# Patient Record
Sex: FEMALE | Race: WHITE | HISPANIC OR LATINO | Employment: UNEMPLOYED | URBAN - METROPOLITAN AREA
[De-identification: names, ages, dates, MRNs, and addresses within clinical notes are randomized per-mention and may not be internally consistent; named-entity substitution may affect disease eponyms.]

---

## 2017-06-04 ENCOUNTER — HOSPITAL ENCOUNTER (EMERGENCY)
Facility: HOSPITAL | Age: 42
Discharge: HOME | End: 2017-06-04
Attending: EMERGENCY MEDICINE

## 2017-06-04 VITALS
TEMPERATURE: 99 F | BODY MASS INDEX: 29.32 KG/M2 | RESPIRATION RATE: 16 BRPM | HEART RATE: 84 BPM | SYSTOLIC BLOOD PRESSURE: 139 MMHG | DIASTOLIC BLOOD PRESSURE: 68 MMHG | OXYGEN SATURATION: 99 % | WEIGHT: 176 LBS | HEIGHT: 65 IN

## 2017-06-04 DIAGNOSIS — R10.2 PELVIC PAIN IN FEMALE: Primary | ICD-10-CM

## 2017-06-04 DIAGNOSIS — M54.50 BILATERAL LOW BACK PAIN WITHOUT SCIATICA, UNSPECIFIED CHRONICITY: ICD-10-CM

## 2017-06-04 LAB
B-HCG UR QL: NEGATIVE
BACTERIA #/AREA URNS HPF: NORMAL /HPF
BILIRUB UR QL STRIP: NEGATIVE
CLARITY UR: CLEAR
COLOR UR: YELLOW
CTP QC/QA: YES
GLUCOSE SERPL-MCNC: NORMAL MG/DL (ref 70–?)
GLUCOSE UR QL STRIP: NEGATIVE
HGB UR QL STRIP: ABNORMAL
KETONES UR QL STRIP: NEGATIVE
LEUKOCYTE ESTERASE UR QL STRIP: NEGATIVE
MICROSCOPIC COMMENT: NORMAL
NITRITE UR QL STRIP: NEGATIVE
PH UR STRIP: 6 [PH] (ref 5–8)
PROT UR QL STRIP: NEGATIVE
RBC #/AREA URNS HPF: 3 /HPF (ref 0–4)
SP GR UR STRIP: 1.01 (ref 1–1.03)
URN SPEC COLLECT METH UR: ABNORMAL
UROBILINOGEN UR STRIP-ACNC: NEGATIVE EU/DL
WBC #/AREA URNS HPF: 0 /HPF (ref 0–5)

## 2017-06-04 PROCEDURE — 81000 URINALYSIS NONAUTO W/SCOPE: CPT

## 2017-06-04 PROCEDURE — 82962 GLUCOSE BLOOD TEST: CPT

## 2017-06-04 PROCEDURE — 87591 N.GONORRHOEAE DNA AMP PROB: CPT

## 2017-06-04 PROCEDURE — 63600175 PHARM REV CODE 636 W HCPCS: Performed by: PHYSICIAN ASSISTANT

## 2017-06-04 PROCEDURE — 96372 THER/PROPH/DIAG INJ SC/IM: CPT

## 2017-06-04 PROCEDURE — 99284 EMERGENCY DEPT VISIT MOD MDM: CPT | Mod: 25

## 2017-06-04 RX ORDER — IBUPROFEN 600 MG/1
600 TABLET ORAL EVERY 6 HOURS PRN
Qty: 20 TABLET | Refills: 0 | Status: SHIPPED | OUTPATIENT
Start: 2017-06-04 | End: 2017-06-11

## 2017-06-04 RX ORDER — KETOROLAC TROMETHAMINE 30 MG/ML
30 INJECTION, SOLUTION INTRAMUSCULAR; INTRAVENOUS
Status: COMPLETED | OUTPATIENT
Start: 2017-06-04 | End: 2017-06-04

## 2017-06-04 RX ADMIN — KETOROLAC TROMETHAMINE 30 MG: 30 INJECTION, SOLUTION INTRAMUSCULAR at 12:06

## 2017-06-04 NOTE — ED NOTES
Pt c/o right lower back pain and pelvic pain x1 week. Also states she missed her last period, and LMP was 4/28. Denies vaginal discharge or bleeding.

## 2017-06-04 NOTE — ED PROVIDER NOTES
Encounter Date: 6/4/2017       History     Chief Complaint   Patient presents with    Back Pain     lower back pain with pelvic pain x 1 week.  Denies vaginal discharge or fever.  Denies nausea/vomiting. Denies trauma.  Reports breasts feel swollen     Review of patient's allergies indicates:  No Known Allergies  41-year-old nontoxic/afebrile female presents to the ED with lower abdominal cramping for the past few days.  She reports that her cycle is approximately one week late and she is concerned about pregnancy.  She does report some bilateral breast tenderness and lower abdominal cramping however denies any vaginal bleeding.  She states the pain does radiate minimally to the right lower back.  She denies any other associated symptoms including fever, chills, nausea, vomiting, dysuria, hematuria, vaginal discharge, or vaginal bleeding.  She did not try any medications for the symptoms.      The history is provided by the patient.     History reviewed. No pertinent past medical history.  History reviewed. No pertinent surgical history.  History reviewed. No pertinent family history.  Social History   Substance Use Topics    Smoking status: Never Smoker    Smokeless tobacco: Not on file    Alcohol use No     Review of Systems   Constitutional: Negative for activity change, appetite change and fever.   HENT: Negative for congestion and sore throat.    Respiratory: Negative for shortness of breath.    Cardiovascular: Negative for chest pain.   Gastrointestinal: Positive for abdominal pain. Negative for constipation, diarrhea, nausea and vomiting.   Endocrine: Negative for polydipsia and polyuria.   Genitourinary: Positive for dysuria and pelvic pain. Negative for decreased urine volume, flank pain, hematuria, vaginal bleeding and vaginal discharge.   Musculoskeletal: Positive for back pain. Negative for arthralgias, myalgias and neck pain.   Skin: Negative for rash.   Neurological: Negative for weakness and  numbness.   Hematological: Does not bruise/bleed easily.       Physical Exam     Initial Vitals [06/04/17 1113]   BP Pulse Resp Temp SpO2   139/68 84 16 98.6 °F (37 °C) 99 %     Physical Exam    Nursing note and vitals reviewed.  Constitutional: Vital signs are normal. She appears well-developed and well-nourished. She is cooperative.  Non-toxic appearance. She does not appear ill. No distress.   HENT:   Head: Normocephalic and atraumatic.   Eyes: Conjunctivae and lids are normal.   Neck: Neck supple. No no neck rigidity.   Cardiovascular: Normal rate and regular rhythm.   Pulmonary/Chest: Breath sounds normal. No respiratory distress. She has no wheezes. She has no rhonchi.   Abdominal: Soft. Normal appearance and bowel sounds are normal. There is no tenderness. There is no rigidity and no guarding.   Musculoskeletal: Normal range of motion.   Neurological: She is alert and oriented to person, place, and time. GCS eye subscore is 4. GCS verbal subscore is 5. GCS motor subscore is 6.   Skin: Skin is warm, dry and intact. No rash noted.   Psychiatric: She has a normal mood and affect. Her speech is normal and behavior is normal. Thought content normal.         ED Course   Procedures  Labs Reviewed   URINALYSIS - Abnormal; Notable for the following:        Result Value    Occult Blood UA 1+ (*)     All other components within normal limits   C. TRACHOMATIS/N. GONORRHOEAE BY AMP DNA   URINALYSIS MICROSCOPIC             Medical Decision Making:   Initial Assessment:   41-year-old nontoxic/afebrile female presents to the ED with lower abdominal cramping for the past few days.  She reports that her cycle is approximately one week late and she is concerned about pregnancy.  She does report some bilateral breast tenderness and lower abdominal cramping however denies any vaginal bleeding.  She states the pain does radiate minimally to the right lower back.  She denies any other associated symptoms including fever, chills,  nausea, vomiting, dysuria, hematuria, vaginal discharge, or vaginal bleeding.  She did not try any medications for the symptoms. physical exam reveals patient well-appearing in no obvious distress.  Mucous membranes are moist and free of pallor.  Lungs clear to auscultation, heart regular rate and rhythm.  Abdomen is soft and nontender with no rebound, rigidity, or CVA tenderness; bowel sounds normal.  Full range of motion of neck and all extremities with strength 5 out of 5 bilaterally skin free of rash, pallor, diaphoresis.  Differential Diagnosis:   UTI, dysmenorrhea, cervicitis, pregnancy, amenorrhea, PID, muscle strain  Clinical Tests:   Lab Tests: Ordered and Reviewed  ED Management:  UA and UPT are unremarkable.  Symptoms reduced with Toradol in the ED.  We will send a GC with possible treatment at that time as low suspicion for infectious etiology at this point.  This patient that denies any vaginal discharge or vaginal pain.  No labs or imaging warranted at this time as patient remained afebrile and well-appearing with no tenderness on exam.  Instructed patient to continue NSAIDs as etiology is likely due to cycle about to start. Patient was cautioned on when to return to ED. she was instructed to follow-up with [facility] pain continue.  Patient verbalized understanding and agreement with the treatment plan.                Attending Attestation:     Physician Attestation Statement for NP/PA:   I discussed this assessment and plan of this patient with the NP/PA, but I did not personally examine the patient. The face to face encounter was performed by the NP/PA.    Other NP/PA Attestation Additions:    History of Present Illness: 41-year-old female with lower abdominal cramping and lower back pain.  Also breast tenderness.  She is concerned she is pregnant.    Medical Decision Making: UPT is negative.  Symptoms seem to be related to her upcoming menstrual cycle.  We will treat symptomatically with NSAIDs.                  ED Course     Clinical Impression:   The primary encounter diagnosis was Pelvic pain in female. A diagnosis of Bilateral low back pain without sciatica, unspecified chronicity was also pertinent to this visit.          SUDARSHAN Esquivel  06/04/17 1541       Sakshi Holley MD  06/04/17 2255

## 2017-06-05 LAB
C TRACH DNA SPEC QL NAA+PROBE: NOT DETECTED
N GONORRHOEA DNA SPEC QL NAA+PROBE: NOT DETECTED

## 2017-06-11 ENCOUNTER — HOSPITAL ENCOUNTER (EMERGENCY)
Facility: HOSPITAL | Age: 42
Discharge: HOME | End: 2017-06-11
Attending: EMERGENCY MEDICINE

## 2017-06-11 VITALS
DIASTOLIC BLOOD PRESSURE: 68 MMHG | SYSTOLIC BLOOD PRESSURE: 117 MMHG | HEART RATE: 70 BPM | BODY MASS INDEX: 29.16 KG/M2 | TEMPERATURE: 98 F | WEIGHT: 175 LBS | RESPIRATION RATE: 14 BRPM | HEIGHT: 65 IN | OXYGEN SATURATION: 100 %

## 2017-06-11 DIAGNOSIS — R10.9 RIGHT FLANK PAIN: ICD-10-CM

## 2017-06-11 DIAGNOSIS — R10.9 ABDOMINAL PAIN, UNSPECIFIED LOCATION: Primary | ICD-10-CM

## 2017-06-11 LAB
ALBUMIN SERPL BCP-MCNC: 3.9 G/DL
ALP SERPL-CCNC: 72 U/L
ALT SERPL W/O P-5'-P-CCNC: 22 U/L
ANION GAP SERPL CALC-SCNC: 9 MMOL/L
AST SERPL-CCNC: 31 U/L
B-HCG UR QL: NEGATIVE
BASOPHILS # BLD AUTO: 0.04 K/UL
BASOPHILS NFR BLD: 0.4 %
BILIRUB SERPL-MCNC: 0.2 MG/DL
BILIRUB UR QL STRIP: NEGATIVE
BUN SERPL-MCNC: 15 MG/DL
CALCIUM SERPL-MCNC: 9.2 MG/DL
CHLORIDE SERPL-SCNC: 105 MMOL/L
CLARITY UR: CLEAR
CO2 SERPL-SCNC: 22 MMOL/L
COLOR UR: YELLOW
CREAT SERPL-MCNC: 0.9 MG/DL
CTP QC/QA: YES
DIFFERENTIAL METHOD: NORMAL
EOSINOPHIL # BLD AUTO: 0.4 K/UL
EOSINOPHIL NFR BLD: 3.9 %
ERYTHROCYTE [DISTWIDTH] IN BLOOD BY AUTOMATED COUNT: 12.6 %
EST. GFR  (AFRICAN AMERICAN): >60 ML/MIN/1.73 M^2
EST. GFR  (NON AFRICAN AMERICAN): >60 ML/MIN/1.73 M^2
GLUCOSE SERPL-MCNC: 103 MG/DL
GLUCOSE UR QL STRIP: NEGATIVE
HCT VFR BLD AUTO: 37.8 %
HGB BLD-MCNC: 12.8 G/DL
HGB UR QL STRIP: ABNORMAL
KETONES UR QL STRIP: NEGATIVE
LEUKOCYTE ESTERASE UR QL STRIP: NEGATIVE
LYMPHOCYTES # BLD AUTO: 3 K/UL
LYMPHOCYTES NFR BLD: 33.5 %
MCH RBC QN AUTO: 30.3 PG
MCHC RBC AUTO-ENTMCNC: 33.9 %
MCV RBC AUTO: 89 FL
MICROSCOPIC COMMENT: ABNORMAL
MONOCYTES # BLD AUTO: 0.8 K/UL
MONOCYTES NFR BLD: 8.3 %
NEUTROPHILS # BLD AUTO: 4.8 K/UL
NEUTROPHILS NFR BLD: 53.6 %
NITRITE UR QL STRIP: NEGATIVE
PH UR STRIP: 7 [PH] (ref 5–8)
PLATELET # BLD AUTO: 268 K/UL
PMV BLD AUTO: 10.1 FL
POTASSIUM SERPL-SCNC: 5.3 MMOL/L
PROT SERPL-MCNC: 7.7 G/DL
PROT UR QL STRIP: ABNORMAL
RBC # BLD AUTO: 4.23 M/UL
RBC #/AREA URNS HPF: 10 /HPF (ref 0–4)
SODIUM SERPL-SCNC: 136 MMOL/L
SP GR UR STRIP: 1.02 (ref 1–1.03)
URN SPEC COLLECT METH UR: ABNORMAL
UROBILINOGEN UR STRIP-ACNC: NEGATIVE EU/DL
WBC # BLD AUTO: 9.01 K/UL

## 2017-06-11 PROCEDURE — 85025 COMPLETE CBC W/AUTO DIFF WBC: CPT

## 2017-06-11 PROCEDURE — 63600175 PHARM REV CODE 636 W HCPCS: Performed by: PHYSICIAN ASSISTANT

## 2017-06-11 PROCEDURE — 80053 COMPREHEN METABOLIC PANEL: CPT

## 2017-06-11 PROCEDURE — 81000 URINALYSIS NONAUTO W/SCOPE: CPT

## 2017-06-11 PROCEDURE — 81025 URINE PREGNANCY TEST: CPT

## 2017-06-11 PROCEDURE — 96374 THER/PROPH/DIAG INJ IV PUSH: CPT

## 2017-06-11 PROCEDURE — 99284 EMERGENCY DEPT VISIT MOD MDM: CPT | Mod: 25

## 2017-06-11 RX ORDER — KETOROLAC TROMETHAMINE 30 MG/ML
30 INJECTION, SOLUTION INTRAMUSCULAR; INTRAVENOUS
Status: COMPLETED | OUTPATIENT
Start: 2017-06-11 | End: 2017-06-11

## 2017-06-11 RX ORDER — KETOROLAC TROMETHAMINE 30 MG/ML
30 INJECTION, SOLUTION INTRAMUSCULAR; INTRAVENOUS
Status: DISCONTINUED | OUTPATIENT
Start: 2017-06-11 | End: 2017-06-11

## 2017-06-11 RX ORDER — IBUPROFEN 600 MG/1
600 TABLET ORAL EVERY 6 HOURS PRN
Qty: 20 TABLET | Refills: 0 | Status: SHIPPED | OUTPATIENT
Start: 2017-06-11 | End: 2019-08-12

## 2017-06-11 RX ADMIN — KETOROLAC TROMETHAMINE 30 MG: 30 INJECTION, SOLUTION INTRAMUSCULAR at 07:06

## 2017-06-11 NOTE — ED PROVIDER NOTES
Encounter Date: 6/11/2017       History     Chief Complaint   Patient presents with    Abdominal Pain     lower abdominal pain accopanied by nausea; denies urinary symptoms or vaginal discharge or fever; x1 week; ibuprofen last taken today at 1400     Review of patient's allergies indicates:  No Known Allergies  Brielle Alvarenga, a 41 y.o. female that presents to the ED for R flank pain that radiates to abdomen x 2 weeks.  She was seen at this facility and diagnosed with pelvic pain and lower back pain.  She was given a prescription for Ibuprofen at that time and states it helps reduce the intensity of her pain, but the pain is persistent.  She states that the pain comes and goes and she describes it as simliar to menstrual cramps, however, she is not on her menstrual cycle.  Her LMP was 04/28/17 and she states that her cycles come every 28 days.  There is no improvement of the pain with bowel movement or rest.  She denies any vaginal bleeding, vaginal discharge, nausea or vomiting.  Abdominal surgeries include tubule ligation with reversal.          LMP 04/28/17      The history is provided by the patient. The history is limited by a language barrier. A  was used.     History reviewed. No pertinent past medical history.  History reviewed. No pertinent surgical history.  History reviewed. No pertinent family history.  Social History   Substance Use Topics    Smoking status: Never Smoker    Smokeless tobacco: Never Used    Alcohol use No     Review of Systems   Constitutional: Negative for fever.   Respiratory: Negative for shortness of breath.    Cardiovascular: Negative for chest pain.   Gastrointestinal: Positive for abdominal pain. Negative for blood in stool, constipation, diarrhea, nausea and vomiting.   Genitourinary: Positive for flank pain (Right sided ). Negative for dysuria, frequency, hematuria, vaginal bleeding, vaginal discharge and vaginal pain.   Skin: Negative for color  change and rash.   Allergic/Immunologic: Negative for immunocompromised state.   Neurological: Negative for dizziness and headaches.   Hematological: Does not bruise/bleed easily.   Psychiatric/Behavioral: Negative for agitation and confusion.   All other systems reviewed and are negative.      Physical Exam     Initial Vitals [06/11/17 1813]   BP Pulse Resp Temp SpO2   (!) 142/74 75 14 98.7 °F (37.1 °C) 99 %     Physical Exam    Nursing note and vitals reviewed.  Constitutional: She appears well-developed and well-nourished. No distress.   HENT:   Head: Normocephalic and atraumatic.   Right Ear: External ear normal.   Left Ear: External ear normal.   Nose: Nose normal.   Mouth/Throat: Oropharynx is clear and moist.   Eyes: Conjunctivae and EOM are normal.   Neck: Normal range of motion.   Cardiovascular: Normal rate and regular rhythm.   Pulmonary/Chest: Breath sounds normal. No respiratory distress. She has no wheezes. She has no rhonchi. She has no rales.   Abdominal: Soft. Bowel sounds are normal. She exhibits no distension. There is tenderness in the suprapubic area. There is CVA tenderness (Right sided). There is no rigidity, no rebound, no guarding, no tenderness at McBurney's point and negative Lerma's sign.   Musculoskeletal: Normal range of motion. She exhibits no tenderness.   Neurological: She is alert and oriented to person, place, and time. She has normal strength.   Skin: Skin is warm and dry. No rash noted. No erythema.   Psychiatric: She has a normal mood and affect. Thought content normal.         ED Course   Procedures  Labs Reviewed   URINALYSIS - Abnormal; Notable for the following:        Result Value    Protein, UA Trace (*)     Occult Blood UA 1+ (*)     All other components within normal limits   URINALYSIS MICROSCOPIC - Abnormal; Notable for the following:     RBC, UA 10 (*)     All other components within normal limits   COMPREHENSIVE METABOLIC PANEL - Abnormal; Notable for the following:      Potassium 5.3 (*)     CO2 22 (*)     All other components within normal limits   CBC W/ AUTO DIFFERENTIAL   POCT URINE PREGNANCY        Imaging Results          CT Renal Stone Study ABD Pelvis WO (Final result)     Abnormal  Result time 06/11/17 19:59:45    Final result by Brionna Alcocer MD (06/11/17 19:59:45)                 Impression:      1. No acute abnormality.    2. Calcified mesenteric nodes, nonspecific and possibly related to prior atypical infection such as fungal or tuberculosis. Also possibly treated lymphoma. Recommend correlation.     This exam has been activated in the EPIC notification system.      Electronically signed by: BRIONNA ALCOCER MD  Date:     06/11/17  Time:    19:59              Narrative:    CT abdomen and pelvis without contrast    Technique: Helical CT images without IV or oral contrast.    Comparison: Lumbar radiograph September 11, 2015    Findings:  Visualized lower chest is unremarkable.     The liver, gallbladder, spleen, pancreas, and adrenals are unremarkable. No intrahepatic or extra hepatic biliary dilation.    No bowel wall thickening or dilation. Normal appendix. No ascites.    Kidneys are unremarkable. Urinary bladder is unremarkable. Uterus and adnexal structures are unremarkable.    Middle abdomen calcified mesenteric nodes, the largest measures 1.2 cm short axis. No surrounding inflammatory change. These calcified nodes are seen on prior lumbar radiograph from 2015. No noncalcified adenopathy.    Body wall soft tissues are unremarkable.    No acute bone abnormality.                                 Medical Decision Making:   Initial Assessment:   R flank pain that radiates to abdomen  Differential Diagnosis:   UTI, nephrolithiasis, hydronephrosis, constipation   Clinical Tests:   Lab Tests: Reviewed and Ordered  The following lab test(s) were unremarkable: CBC, CMP and Urinalysis  Radiological Study: Ordered and Reviewed  ED Management:  Patient presents to ED in  mild distress, afebrile, and nontoxic.  Abdominal exam elicits no evidence of peritoneal signs.  Mild right flank pain noted.  Toradol given with improvement of pain.  CBC and CMP showed no acute abnormalities.  UA shows evidence of slight hematuria.  CT shows no significant abnormalities.  Results were explained to the patient.  Patient was explained the results and she had questions as to why she still has not started her menstrual cycle.  Patient is concerned she is pregnant, and states that pregnancy tests are only positive for her through blood and not through urine.  I explained to her that her urine pregnancy test was negative and that there was no evidence of a pregnancy on her CAT scan therefore she was not pregnant.  Information about potential menopausal symptoms were given.  Patient was instructed to continue taking ibuprofen as needed for pain and to keep her appointment with Dr. Godinez at the end of this month for further evaluation.  Rx: Ibuprofen  Other:   I discussed test(s) with the performing physician.              Attending Attestation:     Physician Attestation Statement for NP/PA:   I discussed this assessment and plan of this patient with the NP/PA, but I did not personally examine the patient. The face to face encounter was performed by the NP/PA.                  ED Course     Clinical Impression:   The primary encounter diagnosis was Abdominal pain, unspecified location. A diagnosis of Right flank pain was also pertinent to this visit.          Tamar Rebollar PA-C  06/11/17 6027       Julieta De Oliveira MD  06/22/17 4473

## 2017-06-23 ENCOUNTER — TELEPHONE (OUTPATIENT)
Dept: OBSTETRICS AND GYNECOLOGY | Facility: CLINIC | Age: 42
End: 2017-06-23

## 2017-06-23 NOTE — TELEPHONE ENCOUNTER
Spoke with patient, rescheduled appointment for 06/30/2017 at 1:30 pm. Patient verbalized understanding

## 2019-06-25 ENCOUNTER — HOSPITAL ENCOUNTER (EMERGENCY)
Facility: HOSPITAL | Age: 44
Discharge: HOME | End: 2019-06-25
Attending: EMERGENCY MEDICINE
Payer: COMMERCIAL

## 2019-06-25 VITALS
TEMPERATURE: 98 F | HEART RATE: 60 BPM | DIASTOLIC BLOOD PRESSURE: 64 MMHG | RESPIRATION RATE: 18 BRPM | BODY MASS INDEX: 27.97 KG/M2 | WEIGHT: 174 LBS | SYSTOLIC BLOOD PRESSURE: 118 MMHG | OXYGEN SATURATION: 100 % | HEIGHT: 66 IN

## 2019-06-25 DIAGNOSIS — S13.9XXA NECK SPRAIN, INITIAL ENCOUNTER: Primary | ICD-10-CM

## 2019-06-25 LAB
B-HCG UR QL: NEGATIVE
CTP QC/QA: YES

## 2019-06-25 PROCEDURE — 25000003 PHARM REV CODE 250: Performed by: EMERGENCY MEDICINE

## 2019-06-25 PROCEDURE — 99284 EMERGENCY DEPT VISIT MOD MDM: CPT | Mod: 25

## 2019-06-25 PROCEDURE — 81025 URINE PREGNANCY TEST: CPT | Performed by: EMERGENCY MEDICINE

## 2019-06-25 PROCEDURE — 96372 THER/PROPH/DIAG INJ SC/IM: CPT

## 2019-06-25 PROCEDURE — 63600175 PHARM REV CODE 636 W HCPCS: Performed by: EMERGENCY MEDICINE

## 2019-06-25 PROCEDURE — 93005 ELECTROCARDIOGRAM TRACING: CPT

## 2019-06-25 RX ORDER — CYCLOBENZAPRINE HCL 10 MG
10 TABLET ORAL 3 TIMES DAILY PRN
Qty: 30 TABLET | Refills: 0 | Status: SHIPPED | OUTPATIENT
Start: 2019-06-25 | End: 2019-06-30

## 2019-06-25 RX ORDER — NAPROXEN 500 MG/1
500 TABLET ORAL 2 TIMES DAILY WITH MEALS
Qty: 30 TABLET | Refills: 0 | Status: SHIPPED | OUTPATIENT
Start: 2019-06-25 | End: 2019-08-12

## 2019-06-25 RX ORDER — CYCLOBENZAPRINE HCL 10 MG
10 TABLET ORAL
Status: COMPLETED | OUTPATIENT
Start: 2019-06-25 | End: 2019-06-25

## 2019-06-25 RX ORDER — KETOROLAC TROMETHAMINE 30 MG/ML
30 INJECTION, SOLUTION INTRAMUSCULAR; INTRAVENOUS
Status: COMPLETED | OUTPATIENT
Start: 2019-06-25 | End: 2019-06-25

## 2019-06-25 RX ADMIN — CYCLOBENZAPRINE HYDROCHLORIDE 10 MG: 10 TABLET, FILM COATED ORAL at 09:06

## 2019-06-25 RX ADMIN — KETOROLAC TROMETHAMINE 30 MG: 30 INJECTION, SOLUTION INTRAMUSCULAR at 09:06

## 2019-06-26 DIAGNOSIS — S16.1XXA STRAIN OF NECK MUSCLE, INITIAL ENCOUNTER: Primary | ICD-10-CM

## 2019-06-26 NOTE — ED NOTES
Pt resting in bed. rr even and unlabored on ra. Nadn. Call light within reach. Will continue to monitor.

## 2019-06-26 NOTE — ED NOTES
Pt educated on discharge instructions, medications, and follow up care. Pt verbalized understanding. In house  used.

## 2019-06-26 NOTE — ED NOTES
Pt sleeping, easily woken. rr even and unlabored on ra. Call light within reach. Will continue to monitor.
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
## 2019-08-12 ENCOUNTER — HOSPITAL ENCOUNTER (EMERGENCY)
Facility: HOSPITAL | Age: 44
Discharge: HOME | End: 2019-08-12
Attending: EMERGENCY MEDICINE

## 2019-08-12 VITALS
HEART RATE: 64 BPM | DIASTOLIC BLOOD PRESSURE: 62 MMHG | BODY MASS INDEX: 27.97 KG/M2 | RESPIRATION RATE: 18 BRPM | SYSTOLIC BLOOD PRESSURE: 116 MMHG | HEIGHT: 66 IN | WEIGHT: 174 LBS | TEMPERATURE: 99 F | OXYGEN SATURATION: 99 %

## 2019-08-12 DIAGNOSIS — R10.9 ABDOMINAL PAIN, UNSPECIFIED ABDOMINAL LOCATION: Primary | ICD-10-CM

## 2019-08-12 DIAGNOSIS — N91.2 AMENORRHEA: ICD-10-CM

## 2019-08-12 LAB
ALBUMIN SERPL BCP-MCNC: 4.2 G/DL (ref 3.5–5.2)
ALP SERPL-CCNC: 65 U/L (ref 55–135)
ALT SERPL W/O P-5'-P-CCNC: 18 U/L (ref 10–44)
ANION GAP SERPL CALC-SCNC: 11 MMOL/L (ref 8–16)
AST SERPL-CCNC: 25 U/L (ref 10–40)
B-HCG UR QL: NEGATIVE
BACTERIA GENITAL QL WET PREP: ABNORMAL
BASOPHILS # BLD AUTO: 0.04 K/UL (ref 0–0.2)
BASOPHILS NFR BLD: 0.4 % (ref 0–1.9)
BILIRUB SERPL-MCNC: 0.4 MG/DL (ref 0.1–1)
BILIRUB UR QL STRIP: NEGATIVE
BUN SERPL-MCNC: 11 MG/DL (ref 6–20)
CALCIUM SERPL-MCNC: 9.2 MG/DL (ref 8.7–10.5)
CHLORIDE SERPL-SCNC: 106 MMOL/L (ref 95–110)
CLARITY UR: ABNORMAL
CLUE CELLS VAG QL WET PREP: ABNORMAL
CO2 SERPL-SCNC: 21 MMOL/L (ref 23–29)
COLOR UR: YELLOW
CREAT SERPL-MCNC: 0.8 MG/DL (ref 0.5–1.4)
CTP QC/QA: YES
DIFFERENTIAL METHOD: NORMAL
EOSINOPHIL # BLD AUTO: 0.2 K/UL (ref 0–0.5)
EOSINOPHIL NFR BLD: 2.6 % (ref 0–8)
ERYTHROCYTE [DISTWIDTH] IN BLOOD BY AUTOMATED COUNT: 13.2 % (ref 11.5–14.5)
EST. GFR  (AFRICAN AMERICAN): >60 ML/MIN/1.73 M^2
EST. GFR  (NON AFRICAN AMERICAN): >60 ML/MIN/1.73 M^2
FILAMENT FUNGI VAG WET PREP-#/AREA: ABNORMAL
GLUCOSE SERPL-MCNC: 81 MG/DL (ref 70–110)
GLUCOSE UR QL STRIP: NEGATIVE
HCT VFR BLD AUTO: 41.9 % (ref 37–48.5)
HGB BLD-MCNC: 13.6 G/DL (ref 12–16)
HGB UR QL STRIP: ABNORMAL
KETONES UR QL STRIP: ABNORMAL
LEUKOCYTE ESTERASE UR QL STRIP: NEGATIVE
LIPASE SERPL-CCNC: 28 U/L (ref 4–60)
LYMPHOCYTES # BLD AUTO: 2.5 K/UL (ref 1–4.8)
LYMPHOCYTES NFR BLD: 26.3 % (ref 18–48)
MCH RBC QN AUTO: 29.8 PG (ref 27–31)
MCHC RBC AUTO-ENTMCNC: 32.5 G/DL (ref 32–36)
MCV RBC AUTO: 92 FL (ref 82–98)
MICROSCOPIC COMMENT: ABNORMAL
MONOCYTES # BLD AUTO: 0.8 K/UL (ref 0.3–1)
MONOCYTES NFR BLD: 8.3 % (ref 4–15)
NEUTROPHILS # BLD AUTO: 5.8 K/UL (ref 1.8–7.7)
NEUTROPHILS NFR BLD: 62.4 % (ref 38–73)
NITRITE UR QL STRIP: NEGATIVE
PH UR STRIP: 6 [PH] (ref 5–8)
PLATELET # BLD AUTO: 292 K/UL (ref 150–350)
PMV BLD AUTO: 11 FL (ref 9.2–12.9)
POTASSIUM SERPL-SCNC: 4.3 MMOL/L (ref 3.5–5.1)
PROT SERPL-MCNC: 7.5 G/DL (ref 6–8.4)
PROT UR QL STRIP: NEGATIVE
RBC # BLD AUTO: 4.57 M/UL (ref 4–5.4)
RBC #/AREA URNS HPF: 5 /HPF (ref 0–4)
SODIUM SERPL-SCNC: 138 MMOL/L (ref 136–145)
SP GR UR STRIP: >=1.03 (ref 1–1.03)
SPECIMEN SOURCE: ABNORMAL
T VAGINALIS GENITAL QL WET PREP: ABNORMAL
URN SPEC COLLECT METH UR: ABNORMAL
UROBILINOGEN UR STRIP-ACNC: NEGATIVE EU/DL
WBC # BLD AUTO: 9.3 K/UL (ref 3.9–12.7)
WBC #/AREA URNS HPF: 1 /HPF (ref 0–5)
WBC #/AREA VAG WET PREP: ABNORMAL
YEAST GENITAL QL WET PREP: ABNORMAL

## 2019-08-12 PROCEDURE — 99284 EMERGENCY DEPT VISIT MOD MDM: CPT | Mod: 25

## 2019-08-12 PROCEDURE — 87491 CHLMYD TRACH DNA AMP PROBE: CPT

## 2019-08-12 PROCEDURE — 87210 SMEAR WET MOUNT SALINE/INK: CPT

## 2019-08-12 PROCEDURE — 81000 URINALYSIS NONAUTO W/SCOPE: CPT

## 2019-08-12 PROCEDURE — 80053 COMPREHEN METABOLIC PANEL: CPT

## 2019-08-12 PROCEDURE — 83690 ASSAY OF LIPASE: CPT

## 2019-08-12 PROCEDURE — 81025 URINE PREGNANCY TEST: CPT | Performed by: PHYSICIAN ASSISTANT

## 2019-08-12 PROCEDURE — 85025 COMPLETE CBC W/AUTO DIFF WBC: CPT

## 2019-08-12 NOTE — ED TRIAGE NOTES
Pt seen in the ED with complaints of MARCELL lower abd pain for a month, pt has been intermittent and the pt states that she needed to get it checked. Pt has not worsened, she denies any fever, chills, nausea, vomiting or  symptoms. Pt is [id number], and reports taking, folic acid and pre simone vitamins, pt stable, will continue to monitor.

## 2019-08-13 LAB
C TRACH DNA SPEC QL NAA+PROBE: NOT DETECTED
N GONORRHOEA DNA SPEC QL NAA+PROBE: NOT DETECTED

## 2019-08-13 NOTE — ED NOTES
Report recd, assumed care at this time. Pt is Czech speaking only. Kevin RN at bedside to give report and interpret. Pt denies any abdominal pain at this time. Abdominal CT ordered, awaiting pt to go to CT. CB is within reach. #20 to right wrist patent intact.

## 2019-08-13 NOTE — ED PROVIDER NOTES
Encounter Date: 8/12/2019       History     Chief Complaint   Patient presents with    Abdominal Pain     lower abd pain. denies n/v/d, denies urinary symptoms. denies vaginal bleeding or d/c. denies change in bowel habits.     Breast Pain     pt also c/o bilateral breast pain.      Afebrile 43-year-old female who is [place]ian-speaking presents the ED for evaluation of lower abdominal pain.  Patient reports intermittent abdominal pains for 1/2 months.  She states pain is a pressure sensation located to the lower abdomen.  She does report some abdominal distention and breast tenderness. Patient states that she has been under the care of a physician in [place] receiving unknown injection for fertility purposes.  She states that she is attempting pregnancy.  She reports her last menstrual period is in June of this year.  She does state that she has had a previous tubal ligation with reversal.  She denies any other symptoms at this time.  She has not tried any medications for the symptoms.  She has not seen a physician regarding the pain.    The history is provided by the patient. The history is limited by a language barrier. A  was used.     Review of patient's allergies indicates:  No Known Allergies  No past medical history on file.  No past surgical history on file.  No family history on file.  Social History     Tobacco Use    Smoking status: Never Smoker    Smokeless tobacco: Never Used   Substance Use Topics    Alcohol use: No    Drug use: No     Review of Systems   Constitutional: Negative for chills and fever.   HENT: Negative for sore throat.          breast tenderness   Respiratory: Negative for shortness of breath.    Cardiovascular: Negative for chest pain.   Gastrointestinal: Positive for abdominal distention and abdominal pain. Negative for constipation, diarrhea, nausea and vomiting.   Genitourinary: Positive for menstrual problem and pelvic pain. Negative for dysuria, flank  pain, hematuria, vaginal bleeding and vaginal discharge.   Musculoskeletal: Negative for arthralgias, back pain and myalgias.   Skin: Negative for rash.   Neurological: Negative for weakness and headaches.   Hematological: Does not bruise/bleed easily.       Physical Exam     Initial Vitals [08/12/19 1749]   BP Pulse Resp Temp SpO2   121/69 74 17 98.9 °F (37.2 °C) 100 %      MAP       --         Physical Exam    Nursing note and vitals reviewed.  Constitutional: Vital signs are normal. She appears well-developed and well-nourished. She is cooperative.  Non-toxic appearance. She does not appear ill. No distress.   HENT:   Head: Normocephalic and atraumatic.   Eyes: Conjunctivae and lids are normal.   Neck: Neck supple. No neck rigidity.   Cardiovascular: Normal rate and regular rhythm.   Pulmonary/Chest: Breath sounds normal. No respiratory distress. She has no wheezes. She has no rhonchi.   Abdominal: Soft. Normal appearance and bowel sounds are normal. She exhibits no distension. There is no tenderness. There is no rigidity and no guarding.   Genitourinary:   Genitourinary Comments: Pelvic with small appearing cyst on cervix however no friability or cervical motion tenderness; no adnexal tenderness; no uterine tenderness   Musculoskeletal: Normal range of motion.   Neurological: She is alert and oriented to person, place, and time. GCS eye subscore is 4. GCS verbal subscore is 5. GCS motor subscore is 6.   Skin: Skin is warm, dry and intact. No rash noted.   Psychiatric: She has a normal mood and affect. Her speech is normal and behavior is normal. Thought content normal.         ED Course   Procedures  Labs Reviewed   URINALYSIS, REFLEX TO URINE CULTURE - Abnormal; Notable for the following components:       Result Value    Appearance, UA Hazy (*)     Specific Gravity, UA >=1.030 (*)     Ketones, UA 1+ (*)     Occult Blood UA 2+ (*)     All other components within normal limits    Narrative:     Preferred  Collection Type->Urine, Clean Catch   URINALYSIS MICROSCOPIC - Abnormal; Notable for the following components:    RBC, UA 5 (*)     All other components within normal limits    Narrative:     Preferred Collection Type->Urine, Clean Catch   COMPREHENSIVE METABOLIC PANEL - Abnormal; Notable for the following components:    CO2 21 (*)     All other components within normal limits   VAGINAL SCREEN - Abnormal; Notable for the following components:    Bacteria - Vaginal Screen Few (*)     All other components within normal limits   POCT URINE PREGNANCY - Normal   C. TRACHOMATIS/N. GONORRHOEAE BY AMP DNA    Narrative:     Sources by Resulting Lab:->[facility]   CBC W/ AUTO DIFFERENTIAL   LIPASE          Imaging Results           CT Renal Stone Study ABD Pelvis WO (Final result)  Result time 08/12/19 19:52:35    Final result by Tin Alvarado MD (08/12/19 19:52:35)                 Impression:      This report was flagged in Epic as abnormal.    1. Upper limit of normal caliber appendix noting there may be slight adjacent induration versus motion artifact.  Clinical correlation is recommended, very early changes of appendicitis can present in a similar fashion.  2. High attenuating focus within the posterior aspect of the cervix measuring 0.8 cm, possibly complex nabothian cyst noting overall prominence of the cervix.  Clinical correlation recommended, correlation with ultrasound or physical exam as warranted.  3. Several additional findings above.      Electronically signed by: Tin Alvarado MD  Date:    08/12/2019  Time:    19:52             Narrative:    EXAMINATION:  CT RENAL STONE STUDY ABD PELVIS WO    CLINICAL HISTORY:  pelvic pain;    TECHNIQUE:  Low dose axial images, sagittal and coronal reformations were obtained from the lung bases to the pubic symphysis.  Contrast was not administered.    COMPARISON:  06/11/2017    FINDINGS:  Images of the lower thorax are remarkable for bilateral dependent  atelectasis.    The liver, spleen, pancreas, gallbladder and adrenal glands are grossly unremarkable noting there may be a punctate focus of hypoattenuation within the periphery of the right lower lobe versus venous structure.  Too small for characterization.  There is no biliary dilation or ascites.  The pancreatic duct is not dilated.  No significant abdominal lymphadenopathy.    The kidneys have a grossly unremarkable noncontrast appearance without hydronephrosis or nephrolithiasis.  The bilateral ureters are unable to be followed in their entirety to the urinary bladder, no definite calculi seen noting there may be duplication of the left renal collecting system.  The urinary bladder is unremarkable.  The uterus is remarkable for prominence of the cervix.  There is a high attenuating structure within the posterior aspect of the cervix, finding is nonspecific.  The adnexa is grossly unremarkable noting dominant left ovarian follicle.    There are a few scattered colonic diverticula without surrounding inflammation.  The terminal ileum is unremarkable.  The appendix is upper limits of normal in caliber, noting there is subtle indistinctness about the mid aspect of the appendix.  Clinical correlation needed with any pain in the region, as very early changes of acute appendicitis could present in a similar fashion.  The small bowel is grossly unremarkable.  There are a few scattered shotty periaortic and paracaval lymph nodes.  There is mesenteric dystrophic calcification, stable.    Degenerative changes are noted of the spine.  No significant inguinal lymphadenopathy.  Surgical changes are noted of the anterior abdominal wall.                                 Medical Decision Making:   History:   Old Records Summarized: other records.       <> Summary of Records: I did review patient's chart from similar complaint in the past where she had an abnormal CT at that time with enlarged lymph nodes at the pelvic region.   Given her complaints today we elected to repeat this for further evaluation  Initial Assessment:   43-year-old presents the ED for evaluation of lower abdominal pain.  She states that this has been intermittent for the past 2 months.  She also reports some breast tenderness. She denies any other symptoms at this time.  Patient is overall well-appearing with no significant findings on physical exam.  No tenderness to abdomen on exam no distention or mass appreciated. Pelvic with small appearing cyst on cervix however no friability or cervical motion tenderness; no adnexal tenderness; no uterine tenderness  Differential Diagnosis:   Differential Diagnosis includes, but is not limited to:  Pregnancy complication,ovarian cyst/torsion, STD, foreign body, trauma, kidney stone, UTI, diverticulitis, kidney stone,    Clinical Tests:   Lab Tests: Ordered and Reviewed  Radiological Study: Ordered and Reviewed  ED Management:  Did discuss that there is low suspicion of acute emergent etiology given chronicity of pain and unremarkable physical exam findings however we will evaluate some basic abdominal labs and UA.  Labs are grossly unremarkable.  Given patient's abnormal CT in the past this was repeated for further evaluation.  They do report some possible upper limit of normal appendix however patient has has no right lower quadrant, McBurney point or periumbilical tenderness on exam.  She was informed of this and symptoms to watch for for return.  She did have a possible cyst noted on the cervix which does correlate with clinical exam.  Noted diverticula without inflammation at this time.  No other significant abnormalities to explain etiology of symptoms at this time.  Did discuss that she should follow up with OBGYN and PCP for further evaluation as unclear etiology of this chronic lower abdominal pain. Strict instructions to follow up with primary care physician or reference provided for further assessment and evaluation  and that she could take over-the-counter analgesics for pain. Given instructions to return for any acute symptoms and verbalized understanding of this medical plan. Strict instructions to follow up with primary care physician or reference provided for further assessment and evaluation. Given instructions to return for any acute symptoms and verbalized understanding of this medical plan.                          Clinical Impression:       ICD-10-CM ICD-9-CM   1. Abdominal pain, unspecified abdominal location R10.9 789.00   2. Amenorrhea N91.2 626.0                                SUDARSHAN Esquivel  08/13/19 1137

## 2024-07-13 ENCOUNTER — HOSPITAL ENCOUNTER (INPATIENT)
Facility: HOSPITAL | Age: 49
LOS: 4 days | Discharge: HOME | DRG: 445 | End: 2024-07-17
Attending: EMERGENCY MEDICINE | Admitting: INTERNAL MEDICINE
Payer: MEDICAID

## 2024-07-13 DIAGNOSIS — N12 PYELONEPHRITIS: ICD-10-CM

## 2024-07-13 DIAGNOSIS — R74.8 ELEVATED ALKALINE PHOSPHATASE LEVEL: Primary | ICD-10-CM

## 2024-07-13 DIAGNOSIS — R74.01 TRANSAMINITIS: ICD-10-CM

## 2024-07-13 DIAGNOSIS — K81.9 CHOLECYSTITIS: ICD-10-CM

## 2024-07-13 DIAGNOSIS — R10.10 UPPER ABDOMINAL PAIN: ICD-10-CM

## 2024-07-13 PROBLEM — D69.6 THROMBOCYTOPENIA: Status: ACTIVE | Noted: 2024-07-13

## 2024-07-13 LAB
ALBUMIN SERPL BCP-MCNC: 3.1 G/DL (ref 3.5–5.2)
ALBUMIN SERPL BCP-MCNC: 3.3 G/DL (ref 3.5–5.2)
ALP SERPL-CCNC: 549 U/L (ref 55–135)
ALP SERPL-CCNC: 618 U/L (ref 55–135)
ALT SERPL W/O P-5'-P-CCNC: 175 U/L (ref 10–44)
ALT SERPL W/O P-5'-P-CCNC: 180 U/L (ref 10–44)
AMYLASE SERPL-CCNC: 34 U/L (ref 20–110)
ANION GAP SERPL CALC-SCNC: 10 MMOL/L (ref 8–16)
ANION GAP SERPL CALC-SCNC: 12 MMOL/L (ref 8–16)
AST SERPL-CCNC: 142 U/L (ref 10–40)
AST SERPL-CCNC: 145 U/L (ref 10–40)
B-HCG UR QL: NEGATIVE
BACTERIA #/AREA URNS HPF: ABNORMAL /HPF
BASOPHILS # BLD AUTO: 0.02 K/UL (ref 0–0.2)
BASOPHILS NFR BLD: 0.5 % (ref 0–1.9)
BILIRUB SERPL-MCNC: 0.6 MG/DL (ref 0.1–1)
BILIRUB SERPL-MCNC: 0.9 MG/DL (ref 0.1–1)
BILIRUB UR QL STRIP: NEGATIVE
BUN SERPL-MCNC: 9 MG/DL (ref 6–20)
BUN SERPL-MCNC: 9 MG/DL (ref 6–20)
CALCIUM SERPL-MCNC: 8.6 MG/DL (ref 8.7–10.5)
CALCIUM SERPL-MCNC: 8.7 MG/DL (ref 8.7–10.5)
CHLORIDE SERPL-SCNC: 106 MMOL/L (ref 95–110)
CHLORIDE SERPL-SCNC: 109 MMOL/L (ref 95–110)
CHOLEST SERPL-MCNC: 157 MG/DL (ref 120–199)
CHOLEST/HDLC SERPL: 5.6 {RATIO} (ref 2–5)
CK SERPL-CCNC: 48 U/L (ref 20–180)
CLARITY UR: CLEAR
CO2 SERPL-SCNC: 22 MMOL/L (ref 23–29)
CO2 SERPL-SCNC: 23 MMOL/L (ref 23–29)
COLOR UR: YELLOW
CREAT SERPL-MCNC: 1 MG/DL (ref 0.5–1.4)
CREAT SERPL-MCNC: 1 MG/DL (ref 0.5–1.4)
CRP SERPL-MCNC: 50.8 MG/L (ref 0–8.2)
CTP QC/QA: YES
DIFFERENTIAL METHOD BLD: ABNORMAL
EOSINOPHIL # BLD AUTO: 0.2 K/UL (ref 0–0.5)
EOSINOPHIL NFR BLD: 4.1 % (ref 0–8)
ERYTHROCYTE [DISTWIDTH] IN BLOOD BY AUTOMATED COUNT: 13.3 % (ref 11.5–14.5)
EST. GFR  (NO RACE VARIABLE): >60 ML/MIN/1.73 M^2
EST. GFR  (NO RACE VARIABLE): >60 ML/MIN/1.73 M^2
GLUCOSE SERPL-MCNC: 101 MG/DL (ref 70–110)
GLUCOSE SERPL-MCNC: 87 MG/DL (ref 70–110)
GLUCOSE UR QL STRIP: NEGATIVE
HCT VFR BLD AUTO: 40.1 % (ref 37–48.5)
HDLC SERPL-MCNC: 28 MG/DL (ref 40–75)
HDLC SERPL: 17.8 % (ref 20–50)
HGB BLD-MCNC: 13.3 G/DL (ref 12–16)
HGB UR QL STRIP: ABNORMAL
HYALINE CASTS #/AREA URNS LPF: 0 /LPF
IMM GRANULOCYTES # BLD AUTO: 0.01 K/UL (ref 0–0.04)
IMM GRANULOCYTES NFR BLD AUTO: 0.2 % (ref 0–0.5)
KETONES UR QL STRIP: NEGATIVE
LACTATE SERPL-SCNC: 1.2 MMOL/L (ref 0.5–2.2)
LDLC SERPL CALC-MCNC: 108.8 MG/DL (ref 63–159)
LEUKOCYTE ESTERASE UR QL STRIP: ABNORMAL
LIPASE SERPL-CCNC: 39 U/L (ref 4–60)
LYMPHOCYTES # BLD AUTO: 1.3 K/UL (ref 1–4.8)
LYMPHOCYTES NFR BLD: 30.5 % (ref 18–48)
MCH RBC QN AUTO: 29.5 PG (ref 27–31)
MCHC RBC AUTO-ENTMCNC: 33.2 G/DL (ref 32–36)
MCV RBC AUTO: 89 FL (ref 82–98)
MICROSCOPIC COMMENT: ABNORMAL
MONOCYTES # BLD AUTO: 0.4 K/UL (ref 0.3–1)
MONOCYTES NFR BLD: 9.5 % (ref 4–15)
NEUTROPHILS # BLD AUTO: 2.3 K/UL (ref 1.8–7.7)
NEUTROPHILS NFR BLD: 55.2 % (ref 38–73)
NITRITE UR QL STRIP: POSITIVE
NONHDLC SERPL-MCNC: 129 MG/DL
NRBC BLD-RTO: 0 /100 WBC
PH UR STRIP: 7 [PH] (ref 5–8)
PLATELET # BLD AUTO: 88 K/UL (ref 150–450)
PLATELET BLD QL SMEAR: ABNORMAL
PMV BLD AUTO: 11 FL (ref 9.2–12.9)
POC MOLECULAR INFLUENZA A AGN: NEGATIVE
POC MOLECULAR INFLUENZA B AGN: NEGATIVE
POCT GLUCOSE: 120 MG/DL (ref 70–110)
POTASSIUM SERPL-SCNC: 3.8 MMOL/L (ref 3.5–5.1)
POTASSIUM SERPL-SCNC: 4.1 MMOL/L (ref 3.5–5.1)
PROT SERPL-MCNC: 6.6 G/DL (ref 6–8.4)
PROT SERPL-MCNC: 6.8 G/DL (ref 6–8.4)
PROT UR QL STRIP: ABNORMAL
RBC # BLD AUTO: 4.51 M/UL (ref 4–5.4)
RBC #/AREA URNS HPF: 34 /HPF (ref 0–4)
SARS-COV-2 RDRP RESP QL NAA+PROBE: NEGATIVE
SODIUM SERPL-SCNC: 139 MMOL/L (ref 136–145)
SODIUM SERPL-SCNC: 143 MMOL/L (ref 136–145)
SP GR UR STRIP: 1.02 (ref 1–1.03)
SQUAMOUS #/AREA URNS HPF: 1 /HPF
TRIGL SERPL-MCNC: 101 MG/DL (ref 30–150)
URN SPEC COLLECT METH UR: ABNORMAL
UROBILINOGEN UR STRIP-ACNC: NEGATIVE EU/DL
WBC # BLD AUTO: 4.1 K/UL (ref 3.9–12.7)
WBC #/AREA URNS HPF: 65 /HPF (ref 0–5)
WBC CLUMPS URNS QL MICRO: ABNORMAL

## 2024-07-13 PROCEDURE — 87086 URINE CULTURE/COLONY COUNT: CPT | Performed by: EMERGENCY MEDICINE

## 2024-07-13 PROCEDURE — G0378 HOSPITAL OBSERVATION PER HR: HCPCS

## 2024-07-13 PROCEDURE — 63600175 PHARM REV CODE 636 W HCPCS

## 2024-07-13 PROCEDURE — 99285 EMERGENCY DEPT VISIT HI MDM: CPT | Mod: 25

## 2024-07-13 PROCEDURE — 87186 SC STD MICRODIL/AGAR DIL: CPT | Performed by: EMERGENCY MEDICINE

## 2024-07-13 PROCEDURE — 82550 ASSAY OF CK (CPK): CPT | Performed by: EMERGENCY MEDICINE

## 2024-07-13 PROCEDURE — 87088 URINE BACTERIA CULTURE: CPT | Performed by: EMERGENCY MEDICINE

## 2024-07-13 PROCEDURE — 81025 URINE PREGNANCY TEST: CPT

## 2024-07-13 PROCEDURE — 25000003 PHARM REV CODE 250

## 2024-07-13 PROCEDURE — 96372 THER/PROPH/DIAG INJ SC/IM: CPT | Mod: 59 | Performed by: EMERGENCY MEDICINE

## 2024-07-13 PROCEDURE — 81000 URINALYSIS NONAUTO W/SCOPE: CPT | Performed by: EMERGENCY MEDICINE

## 2024-07-13 PROCEDURE — 25500020 PHARM REV CODE 255: Performed by: EMERGENCY MEDICINE

## 2024-07-13 PROCEDURE — 87635 SARS-COV-2 COVID-19 AMP PRB: CPT | Performed by: EMERGENCY MEDICINE

## 2024-07-13 PROCEDURE — 11000001 HC ACUTE MED/SURG PRIVATE ROOM

## 2024-07-13 PROCEDURE — 63600175 PHARM REV CODE 636 W HCPCS: Performed by: EMERGENCY MEDICINE

## 2024-07-13 PROCEDURE — 87502 INFLUENZA DNA AMP PROBE: CPT

## 2024-07-13 PROCEDURE — 96365 THER/PROPH/DIAG IV INF INIT: CPT

## 2024-07-13 PROCEDURE — 82150 ASSAY OF AMYLASE: CPT

## 2024-07-13 PROCEDURE — 80053 COMPREHEN METABOLIC PANEL: CPT | Performed by: EMERGENCY MEDICINE

## 2024-07-13 PROCEDURE — 86140 C-REACTIVE PROTEIN: CPT

## 2024-07-13 PROCEDURE — 80053 COMPREHEN METABOLIC PANEL: CPT | Mod: 91

## 2024-07-13 PROCEDURE — 25000003 PHARM REV CODE 250: Performed by: EMERGENCY MEDICINE

## 2024-07-13 PROCEDURE — 83690 ASSAY OF LIPASE: CPT | Performed by: EMERGENCY MEDICINE

## 2024-07-13 PROCEDURE — 36415 COLL VENOUS BLD VENIPUNCTURE: CPT

## 2024-07-13 PROCEDURE — 82962 GLUCOSE BLOOD TEST: CPT

## 2024-07-13 PROCEDURE — 85025 COMPLETE CBC W/AUTO DIFF WBC: CPT | Performed by: EMERGENCY MEDICINE

## 2024-07-13 PROCEDURE — 80061 LIPID PANEL: CPT

## 2024-07-13 PROCEDURE — 87040 BLOOD CULTURE FOR BACTERIA: CPT | Mod: 59

## 2024-07-13 PROCEDURE — 83605 ASSAY OF LACTIC ACID: CPT

## 2024-07-13 RX ORDER — SODIUM CHLORIDE 0.9 % (FLUSH) 0.9 %
10 SYRINGE (ML) INJECTION
Status: DISCONTINUED | OUTPATIENT
Start: 2024-07-13 | End: 2024-07-17 | Stop reason: HOSPADM

## 2024-07-13 RX ORDER — OXYCODONE HYDROCHLORIDE 5 MG/1
5 TABLET ORAL EVERY 4 HOURS PRN
Status: DISCONTINUED | OUTPATIENT
Start: 2024-07-13 | End: 2024-07-15

## 2024-07-13 RX ORDER — ACETAMINOPHEN 500 MG
1000 TABLET ORAL ONCE AS NEEDED
Status: COMPLETED | OUTPATIENT
Start: 2024-07-13 | End: 2024-07-13

## 2024-07-13 RX ORDER — SODIUM CHLORIDE, SODIUM LACTATE, POTASSIUM CHLORIDE, CALCIUM CHLORIDE 600; 310; 30; 20 MG/100ML; MG/100ML; MG/100ML; MG/100ML
INJECTION, SOLUTION INTRAVENOUS CONTINUOUS
Status: ACTIVE | OUTPATIENT
Start: 2024-07-13 | End: 2024-07-15

## 2024-07-13 RX ORDER — METOCLOPRAMIDE HYDROCHLORIDE 5 MG/ML
10 INJECTION INTRAMUSCULAR; INTRAVENOUS
Status: COMPLETED | OUTPATIENT
Start: 2024-07-13 | End: 2024-07-13

## 2024-07-13 RX ORDER — KETOROLAC TROMETHAMINE 30 MG/ML
30 INJECTION, SOLUTION INTRAMUSCULAR; INTRAVENOUS
Status: COMPLETED | OUTPATIENT
Start: 2024-07-13 | End: 2024-07-13

## 2024-07-13 RX ORDER — ONDANSETRON 8 MG/1
8 TABLET, ORALLY DISINTEGRATING ORAL EVERY 6 HOURS PRN
Status: DISCONTINUED | OUTPATIENT
Start: 2024-07-13 | End: 2024-07-17 | Stop reason: HOSPADM

## 2024-07-13 RX ADMIN — ACETAMINOPHEN 1000 MG: 500 TABLET ORAL at 09:07

## 2024-07-13 RX ADMIN — IOHEXOL 75 ML: 350 INJECTION, SOLUTION INTRAVENOUS at 10:07

## 2024-07-13 RX ADMIN — KETOROLAC TROMETHAMINE 30 MG: 30 INJECTION, SOLUTION INTRAMUSCULAR; INTRAVENOUS at 08:07

## 2024-07-13 RX ADMIN — PIPERACILLIN AND TAZOBACTAM 4.5 G: 4; .5 INJECTION, POWDER, LYOPHILIZED, FOR SOLUTION INTRAVENOUS; PARENTERAL at 09:07

## 2024-07-13 RX ADMIN — METOCLOPRAMIDE 10 MG: 5 INJECTION, SOLUTION INTRAMUSCULAR; INTRAVENOUS at 08:07

## 2024-07-13 RX ADMIN — CEFTRIAXONE SODIUM 1 G: 1 INJECTION, POWDER, FOR SOLUTION INTRAMUSCULAR; INTRAVENOUS at 10:07

## 2024-07-13 NOTE — ED PROVIDER NOTES
Encounter Date: 7/13/2024       History     Chief Complaint   Patient presents with    Fever     Pt arrives with complaints of fever and body aches for the last 4 days. Pt states she hasn't actually checked her temperature but has been taking medication to treat it. Also reports stomach ache and body chills.      Brielle Alvarenga is a 48 y.o. female who  has no past medical history on file.    The patient presents to the ED due to 4 days of fever and generalized body aches.  She reports cough congestion.  Mild abdominal pain and nausea.  She reports intermittent numbness to her hands which is not present now.  No weakness no diarrhea sore throat.  She reports an abnormal color to her urine.  She denies any medical problems or additional concerns.    A  was offered at no cost and declined.          Review of patient's allergies indicates:  No Known Allergies  No past medical history on file.  No past surgical history on file.  No family history on file.  Social History     Tobacco Use    Smoking status: Never    Smokeless tobacco: Never   Substance Use Topics    Alcohol use: No    Drug use: No     Review of Systems   Constitutional:  Positive for fever. Negative for chills.   HENT:  Negative for sore throat.    Respiratory:  Positive for cough. Negative for shortness of breath.    Cardiovascular:  Negative for chest pain.   Gastrointestinal:  Positive for abdominal pain and nausea. Negative for vomiting.   Genitourinary:  Negative for dysuria, frequency and urgency.   Musculoskeletal:  Positive for myalgias. Negative for back pain, neck pain and neck stiffness.   Skin:  Negative for rash and wound.   Neurological:  Negative for syncope and weakness.   Hematological:  Does not bruise/bleed easily.   Psychiatric/Behavioral:  Negative for agitation, behavioral problems and confusion.        Physical Exam     Initial Vitals [07/13/24 0732]   BP Pulse Resp Temp SpO2   118/78 95 18 99.2 °F (37.3 °C) 98  %      MAP       --         Physical Exam    Constitutional:  Non-toxic appearance. No distress.   HENT:   Head: Normocephalic and atraumatic.   Cardiovascular:  Regular rhythm, S1 normal and S2 normal.           No murmur heard.  Pulmonary/Chest: Breath sounds normal. No respiratory distress. She has no wheezes. She has no rales.   Abdominal: Abdomen is soft. She exhibits no distension. There is abdominal tenderness.   Diffuse abdominal tenderness without rebound or guarding      Neurological: She is alert.   Skin: Skin is warm. No rash noted.   Psychiatric: She has a normal mood and affect.         ED Course   Procedures  Labs Reviewed   URINALYSIS, REFLEX TO URINE CULTURE - Abnormal; Notable for the following components:       Result Value    Protein, UA 1+ (*)     Occult Blood UA 2+ (*)     Nitrite, UA Positive (*)     Leukocytes, UA 2+ (*)     All other components within normal limits    Narrative:     Specimen Source->Urine   URINALYSIS MICROSCOPIC - Abnormal; Notable for the following components:    RBC, UA 34 (*)     WBC, UA 65 (*)     WBC Clumps, UA Few (*)     Bacteria Moderate (*)     All other components within normal limits    Narrative:     Specimen Source->Urine   COMPREHENSIVE METABOLIC PANEL - Abnormal; Notable for the following components:    CO2 22 (*)     Albumin 3.3 (*)     Alkaline Phosphatase 618 (*)      (*)      (*)     All other components within normal limits   CBC W/ AUTO DIFFERENTIAL - Abnormal; Notable for the following components:    Platelets 88 (*)     Platelet Estimate Decreased (*)     All other components within normal limits   POCT GLUCOSE - Abnormal; Notable for the following components:    POCT Glucose 120 (*)     All other components within normal limits   CULTURE, URINE   LIPASE   CK   CK   SARS-COV-2 RDRP GENE   POCT INFLUENZA A/B MOLECULAR   POCT URINE PREGNANCY   POCT GLUCOSE MONITORING CONTINUOUS          Imaging Results              X-Ray Chest AP Portable  (Final result)  Result time 07/13/24 17:56:00      Final result by Giuseppe Livingston DO (07/13/24 17:56:00)                   Impression:      No acute abnormality.      Electronically signed by: Giuseppe Livingston  Date:    07/13/2024  Time:    17:56               Narrative:    EXAMINATION:  XR CHEST AP PORTABLE    CLINICAL HISTORY:  Cough;    TECHNIQUE:  Single frontal view of the chest was performed.    COMPARISON:  None    FINDINGS:  The lungs are well expanded and clear. No focal opacities are seen. The pleural spaces are clear. The cardiac silhouette is unremarkable. The visualized osseous structures are unremarkable.                                       US Abdomen Limited (Gallbladder) (Final result)  Result time 07/13/24 14:09:37      Final result by Philip Busch MD (07/13/24 14:09:37)                   Impression:      Gallbladder wall thickening without evidence of wall hyperemia or pericholecystic fluid, and the sonographic Lerma sign is reportedly negative, favored to be related to chronic cholecystitis.  If clinical concern for acute cholecystitis persists, further evaluation with HIDA scan can be obtained as warranted.      Electronically signed by: Philip Busch MD  Date:    07/13/2024  Time:    14:09               Narrative:    EXAMINATION:  US ABDOMEN LIMITED    CLINICAL HISTORY:  Upper abdominal pain, unspecified    TECHNIQUE:  Limited ultrasound of the right upper quadrant of the abdomen targeted on the biliary system.    COMPARISON:  CT abdomen and pelvis earlier same date    FINDINGS:  Gallbladder: Normally distended.  Gallbladder wall is diffusely thickened up to 0.8 cm and slightly heterogeneous.  No significant wall vascularity to suggest hyperemia.  No calculi or pericholecystic fluid.  No sonographic Lerma's sign.    Biliary system: The common duct is not dilated, measuring 3 mm.  No intrahepatic ductal dilatation.    Miscellaneous: No upper abdominal ascites.  Liver is normal in size.   No right hydronephrosis.  Imaged portions of the pancreas are within normal limits.                                        CT Abdomen Pelvis With IV Contrast NO Oral Contrast (Final result)  Result time 07/13/24 12:17:01      Final result by Philip Busch MD (07/13/24 12:17:01)                   Impression:      1. Right upper quadrant inflammatory stranding with apparent mild circumferential wall thickening of the distal stomach may reflect sequela of a nonspecific gastritis, including peptic ulcer disease.  Inflammatory stranding and non organized free fluid about the proximal duodenum a significant wall thickening, presumably reactive from suspected gastric inflammatory process.  No evidence of hollow viscus perforation.  2. Gallbladder diffuse wall thickening which suspected mild mucosal hyperenhancement which can be seen with acute or chronic cholecystitis.  Further evaluation with right upper quadrant ultrasound can be obtained as warranted.  3. Left renal 2 subtle ill-defined hypoattenuating parenchymal regions at the upper and lower poles with new minimal left-sided perinephric stranding.  This is overall nonspecific but could reflect sequela of left-sided pyelonephritis.  Clinical correlation and with urinalysis advised.  4. Minimal colonic diverticulosis without diverticulitis.  5. Few additional findings as above.  This report was flagged in Epic as abnormal.      Electronically signed by: Philip Busch MD  Date:    07/13/2024  Time:    12:17               Narrative:    EXAMINATION:  CT ABDOMEN PELVIS WITH IV CONTRAST    CLINICAL HISTORY:  Abdominal abscess/infection suspected;    TECHNIQUE:  Low dose axial images, sagittal and coronal reformations were obtained from the lung bases to the pubic symphysis following the IV administration of 75 mL of Omnipaque 350 .  Oral contrast was not given.    COMPARISON:  CT abdomen and pelvis 08/12/2019    FINDINGS:  Imaged lung bases show minimal dependent  atelectasis and scattered platelike scarring versus atelectasis, without consolidation or pleural effusion.  Base of the heart is within normal limits.    Gallbladder is normally distended with diffuse gallbladder wall thickening and suspected mild mucosal hyperenhancement.  No significant biliary ductal dilatation.    Liver is normal in size noting few scattered subcentimeter hypoattenuating parenchymal foci which are too small to characterize.  There is suspected mild diffuse periportal edema.  Portal vasculature is patent.    Pancreas is normal in morphology spleen is normal in size without focal process.  Bilateral adrenal glands are within normal limits.    Stomach is minimally distended with mild wall thickening at the distal gastric body and antrum.  There is also mild stranding about the distal portion of the stomach and duodenal C-loop.  Duodenum is otherwise normal in morphology.    Bilateral kidneys are normal in size, shape and location.  Subtle ill-defined hypoattenuating parenchymal regions at the left renal upper pole measuring up to 2.8 cm and also lower pole measuring up to 2.2 cm noting minimal perinephric stranding on the left few scattered tiny hypoattenuating parenchymal foci at each kidney which are too small to characterize.  No hydronephrosis.  No significant perinephric stranding on the right.  Ureters are normal in course and caliber.  Urinary bladder is within normal limits.  Uterus and bilateral adnexa are within normal limits.  Small pelvic phleboliths noted.  Trace volume free fluid in the pelvis similar to prior.    Appendix is upper limits of normal in caliber similar to prior noting similar chronic mural fat deposition of the proximal portion but no significant periappendiceal flat stranding mild fatty hypertrophy of the ileocecal valve.  Mild mural fat deposition of the cecum, proximal ascending colon and also terminal ileum without evidence of acute inflammation.  Scattered colonic  diverticula without focal diverticulitis.  No bowel obstruction.  Ptosis or portal venous gas.    Mildly prominent abby hepatis lymph node increased in size from prior.  Multiple scattered nonenlarged mesenteric, retroperitoneal, pelvic and bilateral inguinal lymph nodes similar to prior trace volume likely reactive free fluid at right upper quadrant tracking along the right more than left pericolic gutters increased from prior.  No free air.    Stable enlarged clustered calcifications within the right small bowel mesentery which may reflect large calcified lymph nodes, nonspecific.    No significant atherosclerosis.  No aortic aneurysm or dissection.    Extraperitoneal soft tissues and osseous structures appear stable without acute findings.                                       Medications   sodium chloride 0.9% flush 10 mL (has no administration in time range)   piperacillin-tazobactam (ZOSYN) 4.5 g in D5W 100 mL IVPB (MB+) (4.5 g Intravenous New Bag 7/14/24 0557)   oxyCODONE immediate release tablet 5 mg (has no administration in time range)   ondansetron disintegrating tablet 8 mg (has no administration in time range)   lactated ringers infusion ( Intravenous New Bag 7/14/24 0118)   magnesium sulfate 2g in water 50mL IVPB (premix) (2 g Intravenous New Bag 7/14/24 0824)   ketorolac injection 30 mg (30 mg Intramuscular Given 7/13/24 0836)   metoclopramide injection 10 mg (10 mg Intramuscular Given 7/13/24 0835)   cefTRIAXone (Rocephin) 1 g in D5W 100 mL IVPB (MB+) (0 g Intravenous Stopped 7/13/24 1112)   iohexoL (OMNIPAQUE 350) injection 100 mL (75 mLs Intravenous Given 7/13/24 1059)   acetaminophen tablet 1,000 mg (1,000 mg Oral Given 7/13/24 2112)     Medical Decision Making  Differential Diagnosis includes, but is not limited to:  Sepsis, bacteremia, UTI, pneumonia, cellulitis, abscess, indwelling line/catheter infection, cholecystitis, viral URI, gastroenteritis, viral syndrome, sinusitis, otitis  media/externa, neoplasm, drug reaction, serotonin syndrome, intoxication/withdrawal syndrome.      Amount and/or Complexity of Data Reviewed  Labs: ordered. Decision-making details documented in ED Course.  Radiology: ordered.    Risk  Prescription drug management.  Risk Details: Patient found to have CTA and urinalysis findings to suggest pyelonephritis.  Liver enzymes are elevated additionally she has thrombocytopenia.  She has CT findings which may reflect duodenitis/gastritis.  Additionally she has diffuse gallbladder wall thickening without apparent gallstones.  In light of her CT findings lab work and history I think she would benefit from further evaluation treatment of her symptoms.  I discussed with General surgery who will evaluate patient during admission.  She was started on empiric Rocephin for pyelonephritis treatment.               ED Course as of 07/14/24 0828   Sat Jul 13, 2024   0954 POCT Influenza A/B Molecular [RN]   0954 POCT COVID-19 Rapid Screening [RN]   1030 Lipase [RN]   1031 Comprehensive metabolic panel(!) [RN]   1031 CBC auto differential(!) [RN]      ED Course User Index  [RN] Maik An Jr., MD                           Clinical Impression:  Final diagnoses:  [R10.10] Upper abdominal pain  [N12] Pyelonephritis          ED Disposition Condition    Observation Stable            Portions of this note were dictated using voice recognition software and may contain dictation related errors in spelling/grammar/syntax not found on text review          Maik An Jr., MD  07/14/24 0849

## 2024-07-13 NOTE — PLAN OF CARE
Pt admitted from er, speaks little english, understands some english, using the [employer] interpretor, at this time denies abd pain, nausea, abd is slightly distended, round, tender , distended mainly mid to upper abd, bowel sounds audible, passing gas  explained the plan of care that GI and surgeon consulted for possible stones, discussed labs, oriented to unit, understood, also told her that when her  comes  , to give him all the jewelry, wallet, keys, told bradley the jewelry, might be having some procedures, and jewelry will need to be removed, understood  Toe and fingernails have fake nails and adornments

## 2024-07-13 NOTE — H&P
"[facility] Medicine H&P Note     Admitting Team: [facility] Hospitalist Team B  Attending Physician: Valdemar Yates MD  Resident: Luis Miguel Molina MD  Intern: Alonso Quezada MD    Date of Admit: 7/13/2024    Chief Complaint     Fevers, Nausea, abdominal pain x 4 days    Subjective:    #[id number]       History of Present Illness:  Brielle Alvarenga is a 48 y.o. Female with no known past medical history who presented to [facility] ED for subjective fevers onset last 4 days.     The patient was in their usual state of health until 4 days ago when she began to experience subjective fevers. Reports taking diclofenac for them which helped her initially, but reports fevers continued to return. Along with fevers, she also reports having nausea, and abdominal pain/generalized pain "all over" that began yesterday. States the pain is described more as a discomfort. Patient cannot localize one area of pain worse than the other area. She denies any chest pain, shortness of breath, headaches, neck pain. She denies any dysuria or blood in her urine. Denies any bleeding episodes or easy bruising. Does have nausea, but denies any vomiting. Can tolerate PO liquids and solids. Does not take any prescribed medications on a daily basis.     In ED, patient is hemodynamically stable and afebrile. Blood pressures normotensive. Labs remarkable for thrombocytopenia with platelets at 88, no leukocytosis. , , , Tbili WNL. Lipase WNL. Ua micro with RBC 34, WBC, 65, moderate bacteria. Ua with +1 protein, +2 occult blood, Nitrite positive, Leukocytes 2+. UCx pending. CTabd/pelv remarkable for RUQ inflammatory stranding with wall thickening of distal stomach and duodenum, diffuse wall thickening to gall bladder, and left sided perinephric fat stranding with 2 hypo-attenuation to regions at upper and lower poles. US abd with gallbladder wall thickening. Was given one dose of rocephin in ED and admitted to [facility] " "medicine.      Past Medical History:  No past medical history on file.    Past Surgical History:  Fat reduction surgery in [facility]    Allergies:  Review of patient's allergies indicates:  No Known Allergies    Home Medications:  None      Family History:  No family history on file.    Social History:  Social History     Tobacco Use    Smoking status: Never    Smokeless tobacco: Never   Substance Use Topics    Alcohol use: No    Drug use: No       Review of Systems:  Review of Systems   Constitutional:  Positive for chills and fever.   Gastrointestinal:  Positive for abdominal pain and nausea. Negative for diarrhea and vomiting.   Genitourinary:  Negative for dysuria and hematuria.   Musculoskeletal:  Positive for myalgias. Negative for neck pain.   Neurological:  Negative for headaches.     All other systems are reviewed and are negative.      Health Maintaince :   Primary Care Physician: None    Immunizations:   TDap Not up to date    Flu Not up to date    Pna Not up to date   COVID Not up to date     Cancer Screening:  PAP: Not up to date   MMG: Not up to date   Colonoscopy: Not up to date   Chest CT scan: Not up to date      Objective:   Last 24 Hour Vital Signs:  BP  Min: 109/62  Max: 132/71  Temp  Av.7 °F (37.6 °C)  Min: 98.6 °F (37 °C)  Max: 101.5 °F (38.6 °C)  Pulse  Av.2  Min: 63  Max: 95  Resp  Av.2  Min: 18  Max: 20  SpO2  Av.8 %  Min: 96 %  Max: 99 %  Height  Av' 6" (167.6 cm)  Min: 5' 6" (167.6 cm)  Max: 5' 6" (167.6 cm)  Weight  Av.9 kg (178 lb 4.1 oz)  Min: 78.5 kg (173 lb)  Max: 85.1 kg (187 lb 9.8 oz)  Body mass index is 30.28 kg/m².  I/O last 3 completed shifts:  In: 500 [P.O.:400; IV Piggyback:100]  Out: -     Physical Examination:  Physical Exam  Vitals and nursing note reviewed.   Constitutional:       General: She is not in acute distress.     Appearance: Normal appearance. She is not ill-appearing.   HENT:      Head: Normocephalic and atraumatic.      Right " Ear: External ear normal.      Left Ear: External ear normal.      Nose: Nose normal.   Eyes:      Extraocular Movements: Extraocular movements intact.   Cardiovascular:      Rate and Rhythm: Normal rate and regular rhythm.      Pulses: Normal pulses.      Heart sounds: Normal heart sounds. No murmur heard.  Pulmonary:      Effort: Pulmonary effort is normal. No respiratory distress.      Breath sounds: Normal breath sounds. No wheezing.   Abdominal:      General: Abdomen is flat. Bowel sounds are normal.      Palpations: Abdomen is soft.      Tenderness: There is no right CVA tenderness or left CVA tenderness.      Comments: Slight tenderness to palpation to epigastric area with no guarding or rebound. No masses palpated.   Musculoskeletal:         General: No swelling or tenderness. Normal range of motion.   Skin:     General: Skin is warm and dry.   Neurological:      General: No focal deficit present.      Mental Status: She is alert and oriented to person, place, and time.           Laboratory:  Most Recent Data:  CBC:   Lab Results   Component Value Date    WBC 4.10 07/13/2024    HGB 13.3 07/13/2024    HCT 40.1 07/13/2024    PLT 88 (L) 07/13/2024    MCV 89 07/13/2024    RDW 13.3 07/13/2024     WBC Differential: 55 % N, 30.5 % L, 9.5 % M, 4.1 % Eo, 0.5 % Baso,   BMP:   Lab Results   Component Value Date     07/13/2024    K 3.8 07/13/2024     07/13/2024    CO2 23 07/13/2024    BUN 9 07/13/2024    CREATININE 1.0 07/13/2024     07/13/2024    CALCIUM 8.6 (L) 07/13/2024     LFTs:   Lab Results   Component Value Date    PROT 6.6 07/13/2024    ALBUMIN 3.1 (L) 07/13/2024    BILITOT 0.6 07/13/2024     (H) 07/13/2024    ALKPHOS 549 (H) 07/13/2024     (H) 07/13/2024     Urinalysis:   Lab Results   Component Value Date    COLORU Yellow 07/13/2024    SPECGRAV 1.020 07/13/2024    NITRITE Positive (A) 07/13/2024    KETONESU Negative 07/13/2024    UROBILINOGEN Negative 07/13/2024    WBCUA 65  (H) 07/13/2024       Microbiology Data:  SARS-CoV-2: negative    Other Results:  EKG (my interpretation): None    Radiology:  CT Abd/Pelv 7/13/2024  Impression:  1. Right upper quadrant inflammatory stranding with apparent mild circumferential wall thickening of the distal stomach may reflect sequela of a nonspecific gastritis, including peptic ulcer disease.  Inflammatory stranding and non organized free fluid about the proximal duodenum a significant wall thickening, presumably reactive from suspected gastric inflammatory process.  No evidence of hollow viscus perforation.  2. Gallbladder diffuse wall thickening which suspected mild mucosal hyperenhancement which can be seen with acute or chronic cholecystitis.  Further evaluation with right upper quadrant ultrasound can be obtained as warranted.  3. Left renal 2 subtle ill-defined hypoattenuating parenchymal regions at the upper and lower poles with new minimal left-sided perinephric stranding.  This is overall nonspecific but could reflect sequela of left-sided pyelonephritis.  Clinical correlation and with urinalysis advised.  4. Minimal colonic diverticulosis without diverticulitis.    US abd 7/13/2024  Impression:     Gallbladder wall thickening without evidence of wall hyperemia or pericholecystic fluid, and the sonographic Lerma sign is reportedly negative, favored to be related to chronic cholecystitis.   Assessment:     Brielle Alvarenga is a 48 y.o. Female with no known past medical history who presented to [facility] ED for subjective fevers onset last 4 days with associated abdominal/generalized pain and nausea. Patient hemodynamically stable and afebrile in ED, although patient reports taking diclofenac over the last few days. Patient with a fairly unremarkable physical exam besides slight epigastric tenderness. However, patient's labs are remarkable for elevated LFTs with an elevated ALP to 618. Ua with positive nitrites and leukocytes. CT and  US abdomen imaging findings concerning for acute vs chronic cholecystitis, as well as possible pyelonephritis. No leukocytosis and patient currently denies any urinary symptoms. However, will admit to [facility] IM work patient up for cholecystitis and pyelonephritis. General surgery consulted for surgical evaluation.      Plan:     #UTI with possible pyelonephritis  - Ua showing 2+ leukocytes, positive nitrites  - Ua micro with 34 RBC, 65 WBC, moderate bacteria  - CBC with no leukocytosis  - CT abd/pelvis with Left renal 2 subtle ill-defined hypoattenuating parenchymal regions at the upper and lower poles with new minimal left-sided perinephric stranding.  - Patient with overall unremarkable physical exam  - initially afebrile in ED, but now febrile to 101.5   Plan  - initially started on rocephin in ED, but escalated to zosyn IV due to fevers while on abx  - placed on continuous  cc/hr  - Ucx and blood culture x2 pending  - pending retroperitoneal US  - continue to trend and monitor vitals for fevers      #Acute vs Chronic Cholecystitis   - patient with CT and US findings consistent with acute vs chronic cholecystis  - LFTs elevated at , , .  - t bili 0.9  - physical exam with slight epigastric tenderness to palpation  Plan  - general surgery consulted, states gallbladder more calculous like, less likely to be cause of infection currently. No acute intervention  - will continue to monitor LFTs  - f/u with gamma GT  - PRN oxycodone 5 mg q4  - currently on IVF as above.      Healthcare Maintenance  - request PCP order      Diet: ful liquid  DVT: None  IVF: None  Dispo: admit to inpatient medicine for acute/chronic cholecystitis and pyelonephritis management.     Code Status:     Full    Alonso Quezada MD  U Internal Medicine HO-I  [facility] Internal Medicine Team B    \[facility]\"" Medicine Hospitalist Pager numbers:   \[facility]\"" Hospitalist Medicine Team A (Gabriel/Catherine): [phone]  [facility] Hospitalist Medicine Team B  (Estefany/Trudy):

## 2024-07-13 NOTE — PLAN OF CARE
VIRTUAL NURSE: Pt arrived to unit. Permission received to turn camera to view patient. VIP model explained; Patient informed this VN will be working with bedside nurse and the rest of the care team.      Admission questions completed.  Plan of care reviewed with patient.  Educated patient on VTE and fall risk. Safety precautions in place. Call light within reach, side rails up x2.     Patient instucted to ask staff for assistance. Patient verbalized complete understanding.  Will continue to be available and intervene as needed.    Labs, notes, orders, and careplan reviewed.         07/13/24 1845   Admission   Initial VN Admission Questions Complete   Shift   Virtual Nurse - Rounding Complete   Virtual Nurse - Patient Verbalized Approval Of Camera Use;VN Rounding   Type of Frequent Check   Type Patient Rounds   Safety/Activity   Patient Rounds bed in low position;bed wheels locked;call light in patient/parent reach;clutter free environment maintained;ID band on;visualized patient;toileting offered;placement of personal items at bedside   Safety Promotion/Fall Prevention assistive device/personal item within reach   Safety Precautions emergency equipment at bedside   Activity Assistance Provided assistance, 1 person   Positioning   Body Position position changed independently

## 2024-07-14 LAB
ALBUMIN SERPL BCP-MCNC: 3 G/DL (ref 3.5–5.2)
ALP SERPL-CCNC: 509 U/L (ref 55–135)
ALT SERPL W/O P-5'-P-CCNC: 157 U/L (ref 10–44)
ANION GAP SERPL CALC-SCNC: 12 MMOL/L (ref 8–16)
AST SERPL-CCNC: 131 U/L (ref 10–40)
BASOPHILS # BLD AUTO: ABNORMAL K/UL (ref 0–0.2)
BASOPHILS NFR BLD: 0 % (ref 0–1.9)
BILIRUB SERPL-MCNC: 0.8 MG/DL (ref 0.1–1)
BUN SERPL-MCNC: 9 MG/DL (ref 6–20)
CALCIUM SERPL-MCNC: 8.4 MG/DL (ref 8.7–10.5)
CHLORIDE SERPL-SCNC: 106 MMOL/L (ref 95–110)
CO2 SERPL-SCNC: 21 MMOL/L (ref 23–29)
CREAT SERPL-MCNC: 1 MG/DL (ref 0.5–1.4)
DIFFERENTIAL METHOD BLD: ABNORMAL
EOSINOPHIL # BLD AUTO: ABNORMAL K/UL (ref 0–0.5)
EOSINOPHIL NFR BLD: 0 % (ref 0–8)
ERYTHROCYTE [DISTWIDTH] IN BLOOD BY AUTOMATED COUNT: 13.3 % (ref 11.5–14.5)
EST. GFR  (NO RACE VARIABLE): >60 ML/MIN/1.73 M^2
GGT SERPL-CCNC: 391 U/L (ref 8–55)
GLUCOSE SERPL-MCNC: 79 MG/DL (ref 70–110)
HAV IGM SERPL QL IA: NORMAL
HBV CORE IGM SERPL QL IA: NORMAL
HBV SURFACE AG SERPL QL IA: NORMAL
HCT VFR BLD AUTO: 37 % (ref 37–48.5)
HCV AB SERPL QL IA: NORMAL
HGB BLD-MCNC: 12.7 G/DL (ref 12–16)
IMM GRANULOCYTES # BLD AUTO: ABNORMAL K/UL (ref 0–0.04)
IMM GRANULOCYTES NFR BLD AUTO: ABNORMAL % (ref 0–0.5)
INR PPP: 1 (ref 0.8–1.2)
LYMPHOCYTES # BLD AUTO: ABNORMAL K/UL (ref 1–4.8)
LYMPHOCYTES NFR BLD: 52 % (ref 18–48)
MAGNESIUM SERPL-MCNC: 1.6 MG/DL (ref 1.6–2.6)
MCH RBC QN AUTO: 30.3 PG (ref 27–31)
MCHC RBC AUTO-ENTMCNC: 34.3 G/DL (ref 32–36)
MCV RBC AUTO: 88 FL (ref 82–98)
MONOCYTES # BLD AUTO: ABNORMAL K/UL (ref 0.3–1)
MONOCYTES NFR BLD: 9 % (ref 4–15)
NEUTROPHILS NFR BLD: 34 % (ref 38–73)
NEUTS BAND NFR BLD MANUAL: 5 %
NRBC BLD-RTO: 0 /100 WBC
PHOSPHATE SERPL-MCNC: 3.6 MG/DL (ref 2.7–4.5)
PLATELET # BLD AUTO: 81 K/UL (ref 150–450)
PLATELET BLD QL SMEAR: ABNORMAL
PMV BLD AUTO: 11.3 FL (ref 9.2–12.9)
POCT GLUCOSE: 114 MG/DL (ref 70–110)
POTASSIUM SERPL-SCNC: 3.8 MMOL/L (ref 3.5–5.1)
PROT SERPL-MCNC: 6.2 G/DL (ref 6–8.4)
PROTHROMBIN TIME: 10.5 SEC (ref 9–12.5)
RBC # BLD AUTO: 4.19 M/UL (ref 4–5.4)
SODIUM SERPL-SCNC: 139 MMOL/L (ref 136–145)
WBC # BLD AUTO: 6.42 K/UL (ref 3.9–12.7)

## 2024-07-14 PROCEDURE — 25000003 PHARM REV CODE 250

## 2024-07-14 PROCEDURE — 99222 1ST HOSP IP/OBS MODERATE 55: CPT | Mod: ,,, | Performed by: STUDENT IN AN ORGANIZED HEALTH CARE EDUCATION/TRAINING PROGRAM

## 2024-07-14 PROCEDURE — 83735 ASSAY OF MAGNESIUM: CPT

## 2024-07-14 PROCEDURE — 63600175 PHARM REV CODE 636 W HCPCS

## 2024-07-14 PROCEDURE — 85610 PROTHROMBIN TIME: CPT

## 2024-07-14 PROCEDURE — G0378 HOSPITAL OBSERVATION PER HR: HCPCS

## 2024-07-14 PROCEDURE — 85007 BL SMEAR W/DIFF WBC COUNT: CPT | Mod: NCS

## 2024-07-14 PROCEDURE — 80074 ACUTE HEPATITIS PANEL: CPT

## 2024-07-14 PROCEDURE — 80053 COMPREHEN METABOLIC PANEL: CPT

## 2024-07-14 PROCEDURE — 36415 COLL VENOUS BLD VENIPUNCTURE: CPT

## 2024-07-14 PROCEDURE — 85027 COMPLETE CBC AUTOMATED: CPT

## 2024-07-14 PROCEDURE — 82977 ASSAY OF GGT: CPT

## 2024-07-14 PROCEDURE — 84100 ASSAY OF PHOSPHORUS: CPT

## 2024-07-14 PROCEDURE — 11000001 HC ACUTE MED/SURG PRIVATE ROOM

## 2024-07-14 RX ORDER — ACETAMINOPHEN 500 MG
1000 TABLET ORAL ONCE
Status: COMPLETED | OUTPATIENT
Start: 2024-07-14 | End: 2024-07-14

## 2024-07-14 RX ORDER — MAGNESIUM SULFATE HEPTAHYDRATE 40 MG/ML
2 INJECTION, SOLUTION INTRAVENOUS ONCE
Status: COMPLETED | OUTPATIENT
Start: 2024-07-14 | End: 2024-07-14

## 2024-07-14 RX ADMIN — MAGNESIUM SULFATE HEPTAHYDRATE 2 G: 40 INJECTION, SOLUTION INTRAVENOUS at 08:07

## 2024-07-14 RX ADMIN — PIPERACILLIN AND TAZOBACTAM 4.5 G: 4; .5 INJECTION, POWDER, LYOPHILIZED, FOR SOLUTION INTRAVENOUS; PARENTERAL at 09:07

## 2024-07-14 RX ADMIN — ACETAMINOPHEN 1000 MG: 500 TABLET ORAL at 05:07

## 2024-07-14 RX ADMIN — PIPERACILLIN AND TAZOBACTAM 4.5 G: 4; .5 INJECTION, POWDER, LYOPHILIZED, FOR SOLUTION INTRAVENOUS; PARENTERAL at 12:07

## 2024-07-14 RX ADMIN — PIPERACILLIN AND TAZOBACTAM 4.5 G: 4; .5 INJECTION, POWDER, LYOPHILIZED, FOR SOLUTION INTRAVENOUS; PARENTERAL at 05:07

## 2024-07-14 RX ADMIN — SODIUM CHLORIDE, POTASSIUM CHLORIDE, SODIUM LACTATE AND CALCIUM CHLORIDE: 600; 310; 30; 20 INJECTION, SOLUTION INTRAVENOUS at 01:07

## 2024-07-14 NOTE — CONSULTS
[facility]  General Surgery  Consult Note    Patient Name: Brielle Alvarenga  MRN: [id number]  Code Status: Full Code  Admission Date: 7/13/2024  Hospital Length of Stay: 0 days  Attending Physician: Valdemar Yates MD  Primary Care Provider: Annalisa Primary Doctor    Patient information was obtained from patient, past medical records, and ER records.     Inpatient consult to General surgery  Consult performed by: aHile Eddy MD  Consult ordered by: Luis Miguel Molina MD        Subjective:     Principal Problem: Pyelonephritis    History of Present Illness: Brielle Alvarenga is a 48 y.o. female who presented with 4 day history of intermittent fevers and generalized abdominal pain. She was found to have normal T bili and transaminitits as well as positive nitrites and bacteria on UA and imaging with inflammatory changes possibly consistent with pyleonephritis, gastritis, or cholecystitis. She underwent RUQ US with a negative pompa sign and no evidence of cholelithiasis, but GB wall thickening possibly reflective of cholecystitis. General surgery was consulted for evaluation for cholecystectomy. Ms. Alvarenga was given 1g of rocephin in the ED and then placed on zosyn overnight. She was afebrile at presentation, but did have a fever to 101.5 overnight. This morning she states she is feeling better. Her abdominal pain has subsided.     No current facility-administered medications on file prior to encounter.     No current outpatient medications on file prior to encounter.       Review of patient's allergies indicates:  No Known Allergies    No past medical history on file.  No past surgical history on file.  Family History    None       Tobacco Use    Smoking status: Never    Smokeless tobacco: Never   Substance and Sexual Activity    Alcohol use: No    Drug use: No    Sexual activity: Not on file     Review of Systems   Constitutional:  Positive for fever.   Gastrointestinal:  Positive for abdominal pain. Negative  for nausea and vomiting.     Objective:     Vital Signs (Most Recent):  Temp: 98.3 °F (36.8 °C) (07/14/24 0803)  Pulse: 68 (07/14/24 0803)  Resp: 18 (07/14/24 0803)  BP: 116/69 (07/14/24 0803)  SpO2: 97 % (07/14/24 0803) Vital Signs (24h Range):  Temp:  [98.3 °F (36.8 °C)-101.5 °F (38.6 °C)] 98.3 °F (36.8 °C)  Pulse:  [63-79] 68  Resp:  [18-20] 18  SpO2:  [96 %-99 %] 97 %  BP: (109-132)/(62-72) 116/69     Weight: 85.1 kg (187 lb 9.8 oz)  Body mass index is 30.28 kg/m².     Physical Exam  Vitals reviewed.   Constitutional:       General: She is not in acute distress.     Appearance: She is obese. She is not toxic-appearing.   HENT:      Head: Normocephalic and atraumatic.   Eyes:      General: No scleral icterus.     Extraocular Movements: Extraocular movements intact.   Cardiovascular:      Rate and Rhythm: Normal rate.   Pulmonary:      Effort: Pulmonary effort is normal. No respiratory distress.   Abdominal:      General: There is no distension.      Palpations: Abdomen is soft.      Tenderness: There is no abdominal tenderness. There is no guarding or rebound.   Skin:     General: Skin is warm and dry.      Coloration: Skin is not jaundiced.   Neurological:      Mental Status: She is alert.            I have reviewed all pertinent lab results within the past 24 hours.    Significant Diagnostics:  I have reviewed all pertinent imaging results/findings within the past 24 hours.    Assessment/Plan:     Transaminitis  Brielle Alvarenga is a 48 y.o. female who presented with 4 days of nonspecific, diffuse abdominal pain and intermittent fevers and was found to have a UTI with positive nitrates on imaging. She had a normal bili but elevated Alk phos, AST, and ALT on presentation. She underwent abdominal imaging with both CT and RUQ US which demonstrated GB wall thickening, but no stones or pericholecystic fluid was seen. Additionally her sonographic pompa sign was negative and there was no biliary ductal dilation.  General surgery was consulted for cholecystectomy. This is not consistent with cholecystitis and in tasha of the positive nitrites on UTI and inflammatory changes around the L kidney on CT her febrile illness is likely 2/2 urologic origin.     -No acute surgical intervention necessary for GB at this time; I do not think she would benefit from HIDA scan to further rule out cholecystitis   -Pyelonephritis and transaminitis workup tx per primary  -General surgery to sign off. Please do not hesitate to contact with any questions or concerns      VTE Risk Mitigation (From admission, onward)           Ordered     Place sequential compression device  Until discontinued         07/13/24 1512     IP VTE LOW RISK PATIENT  Once         07/13/24 1512                    Thank you for your consult. I will sign off. Please contact us if you have any additional questions.    Haile Eddy MD  General Surgery  [facility]

## 2024-07-14 NOTE — PLAN OF CARE
VN note: Chart review completed. Patient labs and notes reviewed. Care plan and goals updated. VN available to intervene as needed.      Problem: Adult Inpatient Plan of Care  Goal: Plan of Care Review  Outcome: Progressing

## 2024-07-14 NOTE — SUBJECTIVE & OBJECTIVE
No current facility-administered medications on file prior to encounter.     No current outpatient medications on file prior to encounter.       Review of patient's allergies indicates:  No Known Allergies    No past medical history on file.  No past surgical history on file.  Family History    None       Tobacco Use    Smoking status: Never    Smokeless tobacco: Never   Substance and Sexual Activity    Alcohol use: No    Drug use: No    Sexual activity: Not on file     Review of Systems   Constitutional:  Positive for fever.   Gastrointestinal:  Positive for abdominal pain. Negative for nausea and vomiting.     Objective:     Vital Signs (Most Recent):  Temp: 98.3 °F (36.8 °C) (07/14/24 0803)  Pulse: 68 (07/14/24 0803)  Resp: 18 (07/14/24 0803)  BP: 116/69 (07/14/24 0803)  SpO2: 97 % (07/14/24 0803) Vital Signs (24h Range):  Temp:  [98.3 °F (36.8 °C)-101.5 °F (38.6 °C)] 98.3 °F (36.8 °C)  Pulse:  [63-79] 68  Resp:  [18-20] 18  SpO2:  [96 %-99 %] 97 %  BP: (109-132)/(62-72) 116/69     Weight: 85.1 kg (187 lb 9.8 oz)  Body mass index is 30.28 kg/m².     Physical Exam  Vitals reviewed.   Constitutional:       General: She is not in acute distress.     Appearance: She is obese. She is not toxic-appearing.   HENT:      Head: Normocephalic and atraumatic.   Eyes:      General: No scleral icterus.     Extraocular Movements: Extraocular movements intact.   Cardiovascular:      Rate and Rhythm: Normal rate.   Pulmonary:      Effort: Pulmonary effort is normal. No respiratory distress.   Abdominal:      General: There is no distension.      Palpations: Abdomen is soft.      Tenderness: There is no abdominal tenderness. There is no guarding or rebound.   Skin:     General: Skin is warm and dry.      Coloration: Skin is not jaundiced.   Neurological:      Mental Status: She is alert.            I have reviewed all pertinent lab results within the past 24 hours.    Significant Diagnostics:  I have reviewed all pertinent imaging  results/findings within the past 24 hours.

## 2024-07-14 NOTE — CONSULTS
"[facility]     CC: Abdominal pain     Interpretor used    HPI 48 y.o. female who presents with nausea and abdominal pain. Reports the pain is epigastric and less so in the RUQ. She has noticed it for 4-5 days now. She does use advil and diclofenac for pain relief. No vomiting and no overt GI bleeding reported. No previous endoscopy. Has been tolerating a diet and denies significant association with food intake. She denies chronic opioid use.       Physical Examination  /70 (BP Location: Right arm, Patient Position: Lying)   Pulse 63   Temp 98.5 °F (36.9 °C) (Oral)   Resp 20   Ht 5' 6" (1.676 m)   Wt 85.1 kg (187 lb 9.8 oz)   LMP 04/28/2017   SpO2 99%   Breastfeeding No   BMI 30.28 kg/m²   General appearace: well appearing, no acute distress, alert   Abdomen: soft, non-tender, non-distended abdomen with no rebound or guarding    Labs:  AST: 131  ALT: 157  Alk phos: 509    Imaging:  Abdominal US reviewed showing thickening in the gallbladder wall with no CBD dilation (3 mm)    CT of the abdomen reviewed with very mild appearing distal thickening in the stomach.      External medical records reviewed including external documents       Assessment:  48 year old female who presents with complaints of abdominal pain and nausea for three days. Mild elevation of liver enzymes in a cholestatic picture with some abnormalities seen in the distal stomach/ proximal duodenum on imaging. Possible cholecystitis as etiology, but surgery reviewed images and suspect this is more of a chronic change. Will evaluate for gastric or ampullary source of obstruction.     (This is an acute illness requiring hospitalization and poses a threat to life or bodily function)    Plan:  -EGD tomorrow   -Check hepatitis serologies and INR  -Trend CMP  -Diet as tolerated today and NPO at midnight       Total encounter time >75 min including all elements of care     Kavon Pedroza MD  [facility] Fellow, PGY-V    Chato " MD Yogi   [facility]

## 2024-07-14 NOTE — PLAN OF CARE
Problem: Adult Inpatient Plan of Care  Goal: Plan of Care Review  Outcome: Progressing  Goal: Patient-Specific Goal (Individualized)  Outcome: Progressing  Goal: Readiness for Transition of Care  Outcome: Progressing     Problem: Infection  Goal: Absence of Infection Signs and Symptoms  Outcome: Progressing     Problem: Fall Injury Risk  Goal: Absence of Fall and Fall-Related Injury  Outcome: Progressing     Problem: Pain Acute  Goal: Optimal Pain Control and Function  Outcome: Progressing     Problem: UTI (Urinary Tract Infection)  Goal: Improved Infection Symptoms  Outcome: Progressing

## 2024-07-14 NOTE — ASSESSMENT & PLAN NOTE
Brielle Alvarenga is a 48 y.o. female who presented with 4 days of nonspecific, diffuse abdominal pain and intermittent fevers and was found to have a UTI with positive nitrates on imaging. She had a normal bili but elevated Alk phos, AST, and ALT on presentation. She underwent abdominal imaging with both CT and RUQ US which demonstrated GB wall thickening, but no stones or pericholecystic fluid was seen. Additionally her sonographic pompa sign was negative and there was no biliary ductal dilation. General surgery was consulted for cholecystectomy. This is not consistent with cholecystitis and in tasha of the positive nitrites on UTI and inflammatory changes around the L kidney on CT her febrile illness is likely 2/2 urologic origin.     -No acute surgical intervention necessary for GB at this time; I do not think she would benefit from HIDA scan to further rule out cholecystitis   -Pyelonephritis and transaminitis workup tx per primary  -General surgery to sign off. Please do not hesitate to contact with any questions or concerns

## 2024-07-14 NOTE — PROGRESS NOTES
"[facility] Medicine Progress Note    Primary Team: [facility] Hospitalist Team B  Attending Physician: Valdemar Yates MD  Resident: Luis Miguel Molina MD  Intern: Alonso Quezada MD    Subjective:      Patient febrile to 101.5 last evening on rocephin. Escalated abx to zosyn IV. Otherwise no other acute events reported. Patient states pain is improving. Denies any new fevers, chills, back pain, headaches.      Objective:     Last 24 Hour Vital Signs:  BP  Min: 109/62  Max: 132/71  Temp  Av.5 °F (37.5 °C)  Min: 98.5 °F (36.9 °C)  Max: 101.5 °F (38.6 °C)  Pulse  Av  Min: 63  Max: 95  Resp  Av.3  Min: 18  Max: 20  SpO2  Av %  Min: 96 %  Max: 99 %  Height  Av' 6" (167.6 cm)  Min: 5' 6" (167.6 cm)  Max: 5' 6" (167.6 cm)  Weight  Av.9 kg (178 lb 4.1 oz)  Min: 78.5 kg (173 lb)  Max: 85.1 kg (187 lb 9.8 oz)  I/O last 3 completed shifts:  In: 500 [P.O.:400; IV Piggyback:100]  Out: -     Physical Examination:  Physical Exam  Vitals and nursing note reviewed.   Constitutional:       General: She is not in acute distress.     Appearance: Normal appearance. She is not ill-appearing.   HENT:      Head: Normocephalic and atraumatic.      Right Ear: External ear normal.      Left Ear: External ear normal.   Eyes:      Extraocular Movements: Extraocular movements intact.   Cardiovascular:      Rate and Rhythm: Normal rate and regular rhythm.      Pulses: Normal pulses.      Heart sounds: Normal heart sounds. No murmur heard.  Pulmonary:      Effort: Pulmonary effort is normal. No respiratory distress.      Breath sounds: Normal breath sounds. No wheezing.   Abdominal:      General: Abdomen is flat. Bowel sounds are normal.      Palpations: Abdomen is soft.      Tenderness: There is abdominal tenderness in the epigastric area.   Musculoskeletal:         General: No swelling or tenderness. Normal range of motion.   Skin:     General: Skin is warm and dry.   Neurological:      General: No focal deficit present.      " Mental Status: She is alert and oriented to person, place, and time.   Psychiatric:         Mood and Affect: Mood normal.         Behavior: Behavior normal.         Laboratory:  Laboratory Data Reviewed:  Pertinent Findings:  , ,       Microbiology Data Reviewed:  Pertinent Findings:  BCx x2 pending  UCx pending    Other Results:  EKG (my interpretation): None    Radiology Data Reviewed:   Pertinent Findings:  CT Abd/Pelv 7/13/2024  Impression:  1. Right upper quadrant inflammatory stranding with apparent mild circumferential wall thickening of the distal stomach may reflect sequela of a nonspecific gastritis, including peptic ulcer disease.  Inflammatory stranding and non organized free fluid about the proximal duodenum a significant wall thickening, presumably reactive from suspected gastric inflammatory process.  No evidence of hollow viscus perforation.  2. Gallbladder diffuse wall thickening which suspected mild mucosal hyperenhancement which can be seen with acute or chronic cholecystitis.  Further evaluation with right upper quadrant ultrasound can be obtained as warranted.  3. Left renal 2 subtle ill-defined hypoattenuating parenchymal regions at the upper and lower poles with new minimal left-sided perinephric stranding.  This is overall nonspecific but could reflect sequela of left-sided pyelonephritis.  Clinical correlation and with urinalysis advised.  4. Minimal colonic diverticulosis without diverticulitis.     US abd 7/13/2024  Impression:     Gallbladder wall thickening without evidence of wall hyperemia or pericholecystic fluid, and the sonographic Lerma sign is reportedly negative, favored to be related to chronic cholecystitis.     Current Medications:     Infusions:   lactated ringers   Intravenous Continuous 125 mL/hr at 07/14/24 0118 New Bag at 07/14/24 0118        Scheduled:   piperacillin-tazobactam (Zosyn) IV (PEDS and ADULTS) (extended infusion is not  appropriate)  4.5 g Intravenous Q8H        PRN:    Current Facility-Administered Medications:     ondansetron, 8 mg, Oral, Q6H PRN    oxyCODONE, 5 mg, Oral, Q4H PRN    sodium chloride 0.9%, 10 mL, Intravenous, PRN    Antibiotics and Day Number of Therapy:  Zosyn 4.5g IV started 7/13/2024    Lines and Day Number of Therapy:  pIV    Assessment:     Brielle Alvarenga is a 48 y.o. Female with no known past medical history who presented to [facility] ED for subjective fevers onset last 4 days with associated abdominal/generalized pain and nausea. Patient hemodynamically stable and afebrile in ED, although patient reports taking diclofenac over the last few days. Patient with a fairly unremarkable physical exam besides slight epigastric tenderness. However, patient's labs are remarkable for elevated LFTs with an elevated ALP to 618. Ua with positive nitrites and leukocytes. CT and US abdomen imaging findings concerning for acute vs chronic cholecystitis, as well as possible pyelonephritis. No leukocytosis and patient currently denies any urinary symptoms. However, will admit to [facility] IM work patient up for cholecystitis and pyelonephritis. General surgery and GI consulted for evaluation.      Plan:     #Acute vs Chronic Cholecystitis   - patient with CT and US findings consistent with acute vs chronic cholecystis  - LFTs elevated at , , .   - t bili 0.9  - physical exam with slight epigastric tenderness to palpation  Plan  - general surgery consulted, states gallbladder more calculous like, less likely to be cause of infection currently.   - GI consulted, will have EGD for tomorrow. NPO at midnight.  - will continue to monitor LFTs  - PRN oxycodone 5 mg q4  - currently on IVF as above.      #UTI with possible pyelonephritis  - Ua showing 2+ leukocytes, positive nitrites  - Ua micro with 34 RBC, 65 WBC, moderate bacteria  - CBC with no leukocytosis  - CT abd/pelvis with Left renal 2 subtle  ill-defined hypoattenuating parenchymal regions at the upper and lower poles with new minimal left-sided perinephric stranding.  - Patient with overall unremarkable physical exam  - initially afebrile in ED, but now febrile to 101.5   - CRP 50.8  Plan  - initially started on rocephin in ED, but escalated to zosyn IV due to fevers while on abx  - placed on continuous  cc/hr  - Ucx and blood culture x2 pending  - pending retroperitoneal US  - continue to trend and monitor vitals for fevers        Healthcare Maintenance  - request PCP order        Diet: full liquid, NPO at midnight  DVT: None  IVF: LR 125cc/hr  Code: full     Dispo: admit to inpatient medicine for acute/chronic cholecystitis and pyelonephritis management. EGD tomorrow, NPO at midnight.      Alonso Quezada MD  [facility]    [facility] Medicine Hospitalist Pager numbers:   [facility] Hospitalist Medicine Team A (Gabriel/Catherine): [phone]  [facility] Hospitalist Medicine Team B (Estefany/Trudy):  [phone]

## 2024-07-14 NOTE — HPI
Brielle Alvarenga is a 48 y.o. female who presented with 4 day history of intermittent fevers and generalized abdominal pain. She was found to have normal T bili and transaminitits as well as positive nitrites and bacteria on UA and imaging with inflammatory changes possibly consistent with pyleonephritis, gastritis, or cholecystitis. She underwent RUQ US with a negative pompa sign and no evidence of cholelithiasis, but GB wall thickening possibly reflective of cholecystitis. General surgery was consulted for evaluation for cholecystectomy. Ms. Alvarenga was given 1g of rocephin in the ED and then placed on zosyn overnight. She was afebrile at presentation, but did have a fever to 101.5 overnight. This morning she states she is feeling better. Her abdominal pain has subsided.

## 2024-07-15 ENCOUNTER — ANESTHESIA EVENT (OUTPATIENT)
Dept: ENDOSCOPY | Facility: HOSPITAL | Age: 49
DRG: 445 | End: 2024-07-15
Payer: MEDICAID

## 2024-07-15 ENCOUNTER — ANESTHESIA (OUTPATIENT)
Dept: ENDOSCOPY | Facility: HOSPITAL | Age: 49
DRG: 445 | End: 2024-07-15
Payer: MEDICAID

## 2024-07-15 LAB
ALBUMIN SERPL BCP-MCNC: 3 G/DL (ref 3.5–5.2)
ALP SERPL-CCNC: 480 U/L (ref 55–135)
ALT SERPL W/O P-5'-P-CCNC: 176 U/L (ref 10–44)
ANION GAP SERPL CALC-SCNC: 9 MMOL/L (ref 8–16)
AST SERPL-CCNC: 172 U/L (ref 10–40)
BASOPHILS # BLD AUTO: 0.1 K/UL (ref 0–0.2)
BASOPHILS # BLD AUTO: ABNORMAL K/UL (ref 0–0.2)
BASOPHILS NFR BLD: 1 % (ref 0–1.9)
BASOPHILS NFR BLD: 1 % (ref 0–1.9)
BILIRUB SERPL-MCNC: 0.6 MG/DL (ref 0.1–1)
BUN SERPL-MCNC: 8 MG/DL (ref 6–20)
C3 SERPL-MCNC: 123 MG/DL (ref 50–180)
C4 SERPL-MCNC: 40 MG/DL (ref 11–44)
CALCIUM SERPL-MCNC: 8.5 MG/DL (ref 8.7–10.5)
CHLORIDE SERPL-SCNC: 106 MMOL/L (ref 95–110)
CO2 SERPL-SCNC: 23 MMOL/L (ref 23–29)
CREAT SERPL-MCNC: 1.2 MG/DL (ref 0.5–1.4)
DIFFERENTIAL METHOD BLD: ABNORMAL
DIFFERENTIAL METHOD BLD: ABNORMAL
EOSINOPHIL # BLD AUTO: 0.1 K/UL (ref 0–0.5)
EOSINOPHIL # BLD AUTO: ABNORMAL K/UL (ref 0–0.5)
EOSINOPHIL NFR BLD: 1 % (ref 0–8)
EOSINOPHIL NFR BLD: 1.3 % (ref 0–8)
ERYTHROCYTE [DISTWIDTH] IN BLOOD BY AUTOMATED COUNT: 13.2 % (ref 11.5–14.5)
ERYTHROCYTE [DISTWIDTH] IN BLOOD BY AUTOMATED COUNT: 13.4 % (ref 11.5–14.5)
EST. GFR  (NO RACE VARIABLE): 55 ML/MIN/1.73 M^2
GLUCOSE SERPL-MCNC: 89 MG/DL (ref 70–110)
HCT VFR BLD AUTO: 38 % (ref 37–48.5)
HCT VFR BLD AUTO: 40.7 % (ref 37–48.5)
HGB BLD-MCNC: 12.6 G/DL (ref 12–16)
HGB BLD-MCNC: 13.6 G/DL (ref 12–16)
IMM GRANULOCYTES # BLD AUTO: 0.06 K/UL (ref 0–0.04)
IMM GRANULOCYTES # BLD AUTO: ABNORMAL K/UL (ref 0–0.04)
IMM GRANULOCYTES NFR BLD AUTO: 0.6 % (ref 0–0.5)
IMM GRANULOCYTES NFR BLD AUTO: ABNORMAL % (ref 0–0.5)
LYMPHOCYTES # BLD AUTO: 6.4 K/UL (ref 1–4.8)
LYMPHOCYTES # BLD AUTO: ABNORMAL K/UL (ref 1–4.8)
LYMPHOCYTES NFR BLD: 64.1 % (ref 18–48)
LYMPHOCYTES NFR BLD: 66 % (ref 18–48)
MAGNESIUM SERPL-MCNC: 2.2 MG/DL (ref 1.6–2.6)
MCH RBC QN AUTO: 29.6 PG (ref 27–31)
MCH RBC QN AUTO: 29.8 PG (ref 27–31)
MCHC RBC AUTO-ENTMCNC: 33.2 G/DL (ref 32–36)
MCHC RBC AUTO-ENTMCNC: 33.4 G/DL (ref 32–36)
MCV RBC AUTO: 89 FL (ref 82–98)
MCV RBC AUTO: 89 FL (ref 82–98)
MONOCYTES # BLD AUTO: 0.6 K/UL (ref 0.3–1)
MONOCYTES # BLD AUTO: ABNORMAL K/UL (ref 0.3–1)
MONOCYTES NFR BLD: 5.6 % (ref 4–15)
MONOCYTES NFR BLD: 9 % (ref 4–15)
NEUTROPHILS # BLD AUTO: 2.7 K/UL (ref 1.8–7.7)
NEUTROPHILS NFR BLD: 23 % (ref 38–73)
NEUTROPHILS NFR BLD: 27.4 % (ref 38–73)
NRBC BLD-RTO: 0 /100 WBC
NRBC BLD-RTO: 0 /100 WBC
PHOSPHATE SERPL-MCNC: 3.3 MG/DL (ref 2.7–4.5)
PLATELET # BLD AUTO: 105 K/UL (ref 150–450)
PLATELET # BLD AUTO: 87 K/UL (ref 150–450)
PLATELET BLD QL SMEAR: ABNORMAL
PLATELET BLD QL SMEAR: ABNORMAL
PMV BLD AUTO: 10.7 FL (ref 9.2–12.9)
PMV BLD AUTO: 11 FL (ref 9.2–12.9)
POCT GLUCOSE: 86 MG/DL (ref 70–110)
POTASSIUM SERPL-SCNC: 4.3 MMOL/L (ref 3.5–5.1)
PROT SERPL-MCNC: 6.5 G/DL (ref 6–8.4)
RBC # BLD AUTO: 4.26 M/UL (ref 4–5.4)
RBC # BLD AUTO: 4.57 M/UL (ref 4–5.4)
SODIUM SERPL-SCNC: 138 MMOL/L (ref 136–145)
WBC # BLD AUTO: 9.45 K/UL (ref 3.9–12.7)
WBC # BLD AUTO: 9.94 K/UL (ref 3.9–12.7)

## 2024-07-15 PROCEDURE — 86160 COMPLEMENT ANTIGEN: CPT | Mod: 59

## 2024-07-15 PROCEDURE — 86160 COMPLEMENT ANTIGEN: CPT

## 2024-07-15 PROCEDURE — 25000003 PHARM REV CODE 250

## 2024-07-15 PROCEDURE — 88342 IMHCHEM/IMCYTCHM 1ST ANTB: CPT | Performed by: STUDENT IN AN ORGANIZED HEALTH CARE EDUCATION/TRAINING PROGRAM

## 2024-07-15 PROCEDURE — 37000008 HC ANESTHESIA 1ST 15 MINUTES: Performed by: INTERNAL MEDICINE

## 2024-07-15 PROCEDURE — 25000003 PHARM REV CODE 250: Performed by: NURSE ANESTHETIST, CERTIFIED REGISTERED

## 2024-07-15 PROCEDURE — 11000001 HC ACUTE MED/SURG PRIVATE ROOM

## 2024-07-15 PROCEDURE — 83520 IMMUNOASSAY QUANT NOS NONAB: CPT

## 2024-07-15 PROCEDURE — 84100 ASSAY OF PHOSPHORUS: CPT

## 2024-07-15 PROCEDURE — 88305 TISSUE EXAM BY PATHOLOGIST: CPT | Performed by: STUDENT IN AN ORGANIZED HEALTH CARE EDUCATION/TRAINING PROGRAM

## 2024-07-15 PROCEDURE — D9220A PRA ANESTHESIA: Mod: ANES,,, | Performed by: ANESTHESIOLOGY

## 2024-07-15 PROCEDURE — D9220A PRA ANESTHESIA: Mod: CRNA,,, | Performed by: NURSE ANESTHETIST, CERTIFIED REGISTERED

## 2024-07-15 PROCEDURE — 85027 COMPLETE CBC AUTOMATED: CPT

## 2024-07-15 PROCEDURE — 0DB68ZX EXCISION OF STOMACH, VIA NATURAL OR ARTIFICIAL OPENING ENDOSCOPIC, DIAGNOSTIC: ICD-10-PCS | Performed by: INTERNAL MEDICINE

## 2024-07-15 PROCEDURE — 80053 COMPREHEN METABOLIC PANEL: CPT

## 2024-07-15 PROCEDURE — 63600175 PHARM REV CODE 636 W HCPCS: Performed by: NURSE ANESTHETIST, CERTIFIED REGISTERED

## 2024-07-15 PROCEDURE — 85007 BL SMEAR W/DIFF WBC COUNT: CPT

## 2024-07-15 PROCEDURE — 36415 COLL VENOUS BLD VENIPUNCTURE: CPT

## 2024-07-15 PROCEDURE — 43239 EGD BIOPSY SINGLE/MULTIPLE: CPT | Performed by: INTERNAL MEDICINE

## 2024-07-15 PROCEDURE — 83735 ASSAY OF MAGNESIUM: CPT

## 2024-07-15 PROCEDURE — 63600175 PHARM REV CODE 636 W HCPCS

## 2024-07-15 PROCEDURE — 85025 COMPLETE CBC W/AUTO DIFF WBC: CPT

## 2024-07-15 PROCEDURE — 37000009 HC ANESTHESIA EA ADD 15 MINS: Performed by: INTERNAL MEDICINE

## 2024-07-15 RX ORDER — PANTOPRAZOLE SODIUM 40 MG/1
40 TABLET, DELAYED RELEASE ORAL DAILY
Status: DISCONTINUED | OUTPATIENT
Start: 2024-07-15 | End: 2024-07-17 | Stop reason: HOSPADM

## 2024-07-15 RX ORDER — DIPHENHYDRAMINE HCL 25 MG
25 CAPSULE ORAL EVERY 6 HOURS PRN
Status: DISCONTINUED | OUTPATIENT
Start: 2024-07-15 | End: 2024-07-17 | Stop reason: HOSPADM

## 2024-07-15 RX ORDER — LIDOCAINE HYDROCHLORIDE 20 MG/ML
INJECTION, SOLUTION EPIDURAL; INFILTRATION; INTRACAUDAL; PERINEURAL
Status: DISCONTINUED | OUTPATIENT
Start: 2024-07-15 | End: 2024-07-15

## 2024-07-15 RX ORDER — DIPHENHYDRAMINE HCL 50 MG
50 CAPSULE ORAL ONCE
Status: COMPLETED | OUTPATIENT
Start: 2024-07-15 | End: 2024-07-15

## 2024-07-15 RX ORDER — SULFAMETHOXAZOLE AND TRIMETHOPRIM 800; 160 MG/1; MG/1
1 TABLET ORAL 2 TIMES DAILY
Status: DISCONTINUED | OUTPATIENT
Start: 2024-07-16 | End: 2024-07-15

## 2024-07-15 RX ORDER — PROPOFOL 10 MG/ML
VIAL (ML) INTRAVENOUS
Status: DISCONTINUED | OUTPATIENT
Start: 2024-07-15 | End: 2024-07-15

## 2024-07-15 RX ADMIN — PROPOFOL 40 MG: 10 INJECTION, EMULSION INTRAVENOUS at 11:07

## 2024-07-15 RX ADMIN — PROPOFOL 120 MG: 10 INJECTION, EMULSION INTRAVENOUS at 11:07

## 2024-07-15 RX ADMIN — SODIUM CHLORIDE, POTASSIUM CHLORIDE, SODIUM LACTATE AND CALCIUM CHLORIDE: 600; 310; 30; 20 INJECTION, SOLUTION INTRAVENOUS at 01:07

## 2024-07-15 RX ADMIN — PIPERACILLIN AND TAZOBACTAM 4.5 G: 4; .5 INJECTION, POWDER, LYOPHILIZED, FOR SOLUTION INTRAVENOUS; PARENTERAL at 06:07

## 2024-07-15 RX ADMIN — SODIUM CHLORIDE: 0.9 INJECTION, SOLUTION INTRAVENOUS at 11:07

## 2024-07-15 RX ADMIN — LIDOCAINE HYDROCHLORIDE 5 ML: 20 INJECTION, SOLUTION EPIDURAL; INFILTRATION; INTRACAUDAL; PERINEURAL at 11:07

## 2024-07-15 RX ADMIN — DIPHENHYDRAMINE HYDROCHLORIDE 50 MG: 50 CAPSULE ORAL at 01:07

## 2024-07-15 RX ADMIN — PANTOPRAZOLE SODIUM 40 MG: 40 TABLET, DELAYED RELEASE ORAL at 01:07

## 2024-07-15 RX ADMIN — PIPERACILLIN AND TAZOBACTAM 4.5 G: 4; .5 INJECTION, POWDER, LYOPHILIZED, FOR SOLUTION INTRAVENOUS; PARENTERAL at 01:07

## 2024-07-15 NOTE — [employer] PATIENT INSTRUCTIONS
Discharge Summary/Instructions after an Endoscopic Procedure  Patient Name: Brielle Alvarenga  Patient MRN: [id number]  Patient YOB: 1975  Monday, July 15, 2024  Chato Calix MD  Dear patient,  As a result of recent federal legislation (The Federal Cures Act), you may   receive lab or pathology results from your procedure in your [facility]   account before your physician is able to contact you. Your physician or   their representative will relay the results to you with their   recommendations at their soonest availability.  Thank you,  Your health is very important to us during the Covid Crisis. Following your   procedure today, you will receive a daily text for 2 weeks asking about   signs or symptoms of Covid 19.  Please respond to this text when you   receive it so we can follow up and keep you as safe as possible.   RESTRICTIONS:  During your procedure today, you received medications for sedation.  These   medications may affect your judgment, balance and coordination.  Therefore,   for 24 hours, you have the following restrictions:   - DO NOT drive a car, operate machinery, make legal/financial decisions,   sign important papers or drink alcohol.    ACTIVITY:  Today: no heavy lifting, straining or running due to procedural   sedation/anesthesia.  The following day: return to full activity including work.  DIET:  Eat and drink normally unless instructed otherwise.     TREATMENT FOR COMMON SIDE EFFECTS:  - Mild abdominal pain, nausea, belching, bloating or excessive gas:  rest,   eat lightly and use a heating pad.  - Sore Throat: treat with throat lozenges and/or gargle with warm salt   water.  - Because air was used during the procedure, expelling large amounts of air   from your rectum or belching is normal.  - If a bowel prep was taken, you may not have a bowel movement for 1-3 days.    This is normal.  SYMPTOMS TO WATCH FOR AND REPORT TO YOUR PHYSICIAN:  1. Abdominal pain or bloating, other than gas  cramps.  2. Chest pain.  3. Back pain.  4. Signs of infection such as: chills or fever occurring within 24 hours   after the procedure.  5. Rectal bleeding, which would show as bright red, maroon, or black stools.   (A tablespoon of blood from the rectum is not serious, especially if   hemorrhoids are present.)  6. Vomiting.  7. Weakness or dizziness.  GO DIRECTLY TO THE NEAREST EMERGENCY ROOM IF YOU HAVE ANY OF THE FOLLOWING:      Difficulty breathing              Chills and/or fever over 101 F   Persistent vomiting and/or vomiting blood   Severe abdominal pain   Severe chest pain   Black, tarry stools   Bleeding- more than one tablespoon   Any other symptom or condition that you feel may need urgent attention  Your doctor recommends these additional instructions:  If any biopsies were taken, your doctors clinic will contact you in 1 to 2   weeks with any results.  - [facility] patient to [facility] for [facility].   - Resume previous diet.   - Ok to discharge today from GI standpoing on daily PPI therapy  - Strict avoidance of NSAID's  - Await pathology results.  For questions, problems or results please call your physician - Chato Calix MD.  EMERGENCY PHONE NUMBER: [phone],  LAB RESULTS: [phone]  IF A COMPLICATION OR EMERGENCY SITUATION ARISES AND YOU ARE UNABLE TO REACH   YOUR PHYSICIAN - GO DIRECTLY TO THE EMERGENCY ROOM.  MD Chato Heaton MD  7/15/2024 12:26:47 PM  This report has been verified and signed electronically.  Dear patient,  As a result of recent federal legislation (The Federal Cures Act), you may   receive lab or pathology results from your procedure in your [facility]   account before your physician is able to contact you. Your physician or   their representative will relay the results to you with their   recommendations at their soonest availability.  Thank you,  [employer]ATION

## 2024-07-15 NOTE — PLAN OF CARE
SW met with Pt to complete DCA. Pt offered interpretation services and Pt accepted.        07/15/24 1143      Offer of free  was accepted or rejected? accepted   Interpreted items include  visit    service used Video Remote   's ID # [id number]     Michelle Adorno LMSW  Phone: [phone]  [facility]

## 2024-07-15 NOTE — PLAN OF CARE
SW met with Pt at bedside. Pt demographics confirmed. Pt independent with ADL's. Pt reports no need for HHS or DME. Pt not a dialysis or Coumadin Pt. Pt lives with spouse and son Delroy [phone].  Pt family will transport Pt home at D/C. No other needs identified. SW to request f/u as needed. SW to assist with any future needs.     [facility] (H[facility])  Discharge Assessment    Primary Care Provider: No, Primary Doctor     Discharge Assessment (most recent)       BRIEF DISCHARGE ASSESSMENT - 07/15/24 1141          Discharge Planning    Assessment Type Discharge Planning Brief Assessment (P)      Resource/Environmental Concerns none (P)      Support Systems Spouse/significant other;Children (P)      Equipment Currently Used at Home none (P)      Current Living Arrangements home (P)      Patient/Family Anticipates Transition to home;home with family (P)      Patient/Family Anticipated Services at Transition none (P)      DME Needed Upon Discharge  none (P)      Discharge Plan A Home (P)      Discharge Plan B Home with family (P)         Physical Activity    On average, how many days per week do you engage in moderate to strenuous exercise (like a brisk walk)? 0 days (P)      On average, how many minutes do you engage in exercise at this level? 0 min (P)         Financial Resource Strain    How hard is it for you to pay for the very basics like food, housing, medical care, and heating? Not very hard (P)         Housing Stability    In the last 12 months, was there a time when you were not able to pay the mortgage or rent on time? No (P)      At any time in the past 12 months, were you homeless or living in a shelter (including now)? No (P)         Transportation Needs    Has the lack of transportation kept you from medical appointments, meetings, work or from getting things needed for daily living? No (P)         Food Insecurity    Within the past 12 months, you worried that your food would run out before  you got the money to buy more. Never true (P)      Within the past 12 months, the food you bought just didn't last and you didn't have money to get more. Never true (P)         Stress    Do you feel stress - tense, restless, nervous, or anxious, or unable to sleep at night because your mind is troubled all the time - these days? Not at all (P)         Social Isolation    How often do you feel lonely or isolated from those around you?  Never (P)         Alcohol Use    Q1: How often do you have a drink containing alcohol? Never (P)      Q2: How many drinks containing alcohol do you have on a typical day when you are drinking? Patient does not drink (P)      Q3: How often do you have six or more drinks on one occasion? Never (P)         Utilities    In the past 12 months has the electric, gas, oil, or water company threatened to shut off services in your home? No (P)         Health Literacy    How often do you need to have someone help you when you read instructions, pamphlets, or other written material from your doctor or pharmacy? Never (P)                          Michelle Adorno LMSW  Phone: [phone]  [facility]

## 2024-07-15 NOTE — PLAN OF CARE
Problem: Adult Inpatient Plan of Care  Goal: Plan of Care Review  Outcome: Progressing  Goal: Patient-Specific Goal (Individualized)  Outcome: Progressing  Goal: Readiness for Transition of Care  Outcome: Progressing     Problem: Infection  Goal: Absence of Infection Signs and Symptoms  Outcome: Progressing     Problem: Fall Injury Risk  Goal: Absence of Fall and Fall-Related Injury  Outcome: Progressing     Problem: Pain Acute  Goal: Optimal Pain Control and Function  Outcome: Progressing

## 2024-07-15 NOTE — NURSING
Pt back from ENDO.  Pt c/o B hands itching and swelling. Noted redness,swelling, associated with itching to B hands.Notified Dr. MYLES Molina. Dr. Molina at bedside for evaluation.

## 2024-07-15 NOTE — ANESTHESIA POSTPROCEDURE EVALUATION
Anesthesia Post Evaluation    Patient: Brielle Alvarenga    Procedure(s) Performed: Procedure(s) (LRB):  EGD (ESOPHAGOGASTRODUODENOSCOPY) (N/A)    Final Anesthesia Type: general      Patient location during evaluation: PACU  Patient participation: Yes- Able to Participate  Level of consciousness: awake and alert  Post-procedure vital signs: reviewed and stable  Pain management: adequate  Airway patency: patent    PONV status at discharge: No PONV  Anesthetic complications: no      Cardiovascular status: stable  Respiratory status: spontaneous ventilation  Hydration status: euvolemic  Follow-up not needed.              Vitals Value Taken Time   /88 07/15/24 1235   Temp 36.3 °C (97.3 °F) 07/15/24 1204   Pulse 60 07/15/24 1235   Resp 15 07/15/24 1235   SpO2 99 % 07/15/24 1235         Event Time   Out of Recovery 07/15/2024 12:37:23         Pain/Jarocho Score: Pain Rating Prior to Med Admin: 6 (7/14/2024  5:33 PM)  Pain Rating Post Med Admin: 0 (7/14/2024  6:33 PM)  Jarocho Score: 10 (7/15/2024 12:35 PM)

## 2024-07-15 NOTE — PROGRESS NOTES
[facility] Medicine Progress Note    Primary Team: [facility] Hospitalist Team B  Attending Physician: Valdemar Yates MD  Resident: Luis Miguel Molina MD  Intern: Alonso Quezada MD    Subjective:      No acute events overnight. Currently on zosyn, remained afebrile. Feels a lot better this AM, but still reports minor epigastric abd pain. Currently NPO for EGD today with GI. No other complaints this AM     Objective:     Last 24 Hour Vital Signs:  BP  Min: 108/55  Max: 122/68  Temp  Av.3 °F (36.8 °C)  Min: 98 °F (36.7 °C)  Max: 98.6 °F (37 °C)  Pulse  Av.3  Min: 61  Max: 73  Resp  Av.5  Min: 18  Max: 19  SpO2  Av.7 %  Min: 97 %  Max: 99 %  I/O last 3 completed shifts:  In: 860 [P.O.:760; IV Piggyback:100]  Out: -     Physical Examination:  Physical Exam  Vitals and nursing note reviewed.   Constitutional:       General: She is not in acute distress.     Appearance: Normal appearance. She is not ill-appearing.   HENT:      Head: Normocephalic and atraumatic.      Right Ear: External ear normal.      Left Ear: External ear normal.   Eyes:      Extraocular Movements: Extraocular movements intact.   Cardiovascular:      Rate and Rhythm: Normal rate and regular rhythm.      Pulses: Normal pulses.      Heart sounds: Normal heart sounds. No murmur heard.  Pulmonary:      Effort: Pulmonary effort is normal. No respiratory distress.      Breath sounds: Normal breath sounds. No wheezing.   Abdominal:      General: Abdomen is flat. Bowel sounds are normal.      Palpations: Abdomen is soft.      Tenderness: There is abdominal tenderness in the epigastric area.   Musculoskeletal:         General: No swelling or tenderness. Normal range of motion.   Skin:     General: Skin is warm and dry.   Neurological:      General: No focal deficit present.      Mental Status: She is alert and oriented to person, place, and time.   Psychiatric:         Mood and Affect: Mood normal.         Behavior: Behavior normal.          Laboratory:  Laboratory Data Reviewed:  Pertinent Findings:  , ,       Microbiology Data Reviewed:  Pertinent Findings:  BCx x2 pending  UCx pending    Other Results:  EKG (my interpretation): None    Radiology Data Reviewed:   Pertinent Findings:  CT Abd/Pelv 7/13/2024  Impression:  1. Right upper quadrant inflammatory stranding with apparent mild circumferential wall thickening of the distal stomach may reflect sequela of a nonspecific gastritis, including peptic ulcer disease.  Inflammatory stranding and non organized free fluid about the proximal duodenum a significant wall thickening, presumably reactive from suspected gastric inflammatory process.  No evidence of hollow viscus perforation.  2. Gallbladder diffuse wall thickening which suspected mild mucosal hyperenhancement which can be seen with acute or chronic cholecystitis.  Further evaluation with right upper quadrant ultrasound can be obtained as warranted.  3. Left renal 2 subtle ill-defined hypoattenuating parenchymal regions at the upper and lower poles with new minimal left-sided perinephric stranding.  This is overall nonspecific but could reflect sequela of left-sided pyelonephritis.  Clinical correlation and with urinalysis advised.  4. Minimal colonic diverticulosis without diverticulitis.     US abd 7/13/2024  Impression:     Gallbladder wall thickening without evidence of wall hyperemia or pericholecystic fluid, and the sonographic Lerma sign is reportedly negative, favored to be related to chronic cholecystitis.     Current Medications:     Infusions:         Scheduled:   piperacillin-tazobactam (Zosyn) IV (PEDS and ADULTS) (extended infusion is not appropriate)  4.5 g Intravenous Q8H        PRN:    Current Facility-Administered Medications:     ondansetron, 8 mg, Oral, Q6H PRN    oxyCODONE, 5 mg, Oral, Q4H PRN    sodium chloride 0.9%, 10 mL, Intravenous, PRN    Antibiotics and Day Number of Therapy:  Zosyn  4.5g IV started 7/13/2024    Lines and Day Number of Therapy:  pIV    Assessment:     Brielle Alvarenga is a 48 y.o. Female with no known past medical history who presented to [facility] ED for subjective fevers onset last 4 days with associated abdominal/generalized pain and nausea. Patient hemodynamically stable and afebrile in ED, although patient reports taking diclofenac over the last few days. Patient with a fairly unremarkable physical exam besides slight epigastric tenderness. However, patient's labs are remarkable for elevated LFTs with an elevated ALP to 618. Ua with positive nitrites and leukocytes. CT and US abdomen imaging findings concerning for acute vs chronic cholecystitis, as well as possible pyelonephritis. No leukocytosis and patient currently denies any urinary symptoms. However, will admit to [facility] IM work patient up for cholecystitis and pyelonephritis. General surgery and GI consulted for evaluation.      Plan:     #Acute vs Chronic Cholecystitis   - patient with CT and US findings consistent with acute vs chronic cholecystis  - LFTs elevated at , , .   - t bili 0.9  - physical exam with slight epigastric tenderness to palpation  Plan  - general surgery consulted, states gallbladder more calculous like, less likely to be cause of infection currently.   - GI consulted, will have EGD Today. Will follow up.  - will continue to monitor LFTs  - PRN oxycodone 5 mg q4  - currently on IVF as above.      #UTI   - Ua showing 2+ leukocytes, positive nitrites  - Ua micro with 34 RBC, 65 WBC, moderate bacteria  - CBC with no leukocytosis  - CT abd/pelvis with Left renal 2 subtle ill-defined hypoattenuating parenchymal regions at the upper and lower poles with new minimal left-sided perinephric stranding.  - retroperitoneal US 7/13 with no acute findings.  - Patient with overall unremarkable physical exam  - initially afebrile in ED, but now febrile to 101.5   - CRP  50.8  Plan  - initially started on rocephin in ED, but escalated to zosyn IV due to fevers while on abx  - placed on continuous  cc/hr  - Ucx and blood culture x2 pending  - continue to trend and monitor vitals for fevers        Healthcare Maintenance  - request PCP order        Diet: full liquid, NPO at midnight  DVT: None  IVF: LR 125cc/hr  Code: full     Dispo: admit to inpatient medicine for acute/chronic cholecystitis and UTI management. EGD today.      Alonso Quezada MD  [facility] Internal Medicine HO-I    [facility] Medicine Hospitalist Pager numbers:   [facility] Hospitalist Medicine Team A (Gabriel/Catherine): [phone]  [facility] Hospitalist Medicine Team B (Estefany/Trudy):  [phone]

## 2024-07-15 NOTE — PLAN OF CARE
Problem: Adult Inpatient Plan of Care  Goal: Plan of Care Review  Outcome: Progressing  Goal: Patient-Specific Goal (Individualized)  Outcome: Progressing  Goal: Absence of Hospital-Acquired Illness or Injury  Outcome: Progressing     Problem: Infection  Goal: Absence of Infection Signs and Symptoms  Outcome: Progressing     Problem: Fall Injury Risk  Goal: Absence of Fall and Fall-Related Injury  Outcome: Progressing     Problem: Pain Acute  Goal: Optimal Pain Control and Function  Outcome: Progressing     Problem: UTI (Urinary Tract Infection)  Goal: Improved Infection Symptoms  Outcome: Progressing

## 2024-07-15 NOTE — PLAN OF CARE
Plan of Care    Brielle Alvarenga 48 y.o. F w/no significant PMHx here for abd pain concerning for acute vs chronic cholecystitis. Underwent EGD w/GI today and upon arrival back to the floor, pt endorsed swelling and pruritus in bilateral hands, notably in fingertips. No hives noted. Pt was given propofol for anesthesia. Denied SOB or pruritus elsewhere. No other signs of swelling noted including lips, face, and throat. Denies hx of this happening before. Pt not in acute distress.        Vitals obtained:  /78 HR 91 RR 21 O2 96% on room air    Given lack of systemic symptoms or hypotension, low suspicion for anaphylaxis or severe angioedema at this time. Ordered CBC w/diff, C3, C4, C1Einh, and tryptase to evaluate. Will treat w/benadryl.    Update 2:45PM  Symptoms resolved w/benadryl. Pt no longer endorsing pruritus and swelling resolved.    Luis Miguel Molina MD  [facility] Internal Medicine HO-III

## 2024-07-15 NOTE — ANESTHESIA PREPROCEDURE EVALUATION
07/15/2024  Brielle Alvarenga is a 48 y.o., female.      Pre-op Assessment    I have reviewed the Patient Summary Reports.     I have reviewed the Nursing Notes.    I have reviewed the Medications.     Review of Systems  Anesthesia Hx:             Denies Family Hx of Anesthesia complications.    Denies Personal Hx of Anesthesia complications.                    Procedure: EGD (ESOPHAGOGASTRODUODENOSCOPY) (N/A)         Patient Active Problem List   Diagnosis    Cholecystitis    Pyelonephritis    Transaminitis    Elevated alkaline phosphatase level    Thrombocytopenia       No past medical history on file.    ECHO: No results found for this or any previous visit.      Body mass index is 30.28 kg/m².    Tobacco Use: Low Risk  (9/25/2023)    Received from [facility]    Patient History     Smoking Tobacco Use: Never     Smokeless Tobacco Use: Never     Passive Exposure: Not on file       Social History     Substance and Sexual Activity   Drug Use No        Alcohol Use: Not At Risk (2/8/2021)    Received from [facility]    AUDIT-C     Frequency of Alcohol Consumption: Never     Average Number of Drinks: Not on file     Frequency of Binge Drinking: Not on file       Review of patient's allergies indicates:  No Known Allergies      Airway:  No value filed.     Physical Exam  General: Well nourished    Airway:  Mallampati: II   Mouth Opening: Normal  TM Distance: Normal    Chest/Lungs:  Normal Respiratory Rate        Anesthesia Plan  Type of Anesthesia, risks & benefits discussed:    Anesthesia Type: Gen Natural Airway  Intra-op Monitoring Plan: Standard ASA Monitors  Post Op Pain Control Plan: multimodal analgesia  Induction:  IV  Informed Consent: Informed consent signed with the Patient and all parties understand the risks and agree with anesthesia plan.  All questions answered.   ASA Score: 2  Day of Surgery  Review of History & Physical: H&P Update referred to the surgeon/provider.    Ready For Surgery From Anesthesia Perspective.     .

## 2024-07-16 LAB
ALBUMIN SERPL BCP-MCNC: 3.1 G/DL (ref 3.5–5.2)
ALP SERPL-CCNC: 443 U/L (ref 55–135)
ALT SERPL W/O P-5'-P-CCNC: 156 U/L (ref 10–44)
ANION GAP SERPL CALC-SCNC: 9 MMOL/L (ref 8–16)
AST SERPL-CCNC: 133 U/L (ref 10–40)
BACTERIA UR CULT: ABNORMAL
BASOPHILS # BLD AUTO: ABNORMAL K/UL (ref 0–0.2)
BASOPHILS NFR BLD: 2 % (ref 0–1.9)
BILIRUB SERPL-MCNC: 0.5 MG/DL (ref 0.1–1)
BUN SERPL-MCNC: 11 MG/DL (ref 6–20)
CALCIUM SERPL-MCNC: 8.6 MG/DL (ref 8.7–10.5)
CHLORIDE SERPL-SCNC: 107 MMOL/L (ref 95–110)
CO2 SERPL-SCNC: 23 MMOL/L (ref 23–29)
CREAT SERPL-MCNC: 1.3 MG/DL (ref 0.5–1.4)
DIFFERENTIAL METHOD BLD: ABNORMAL
EOSINOPHIL # BLD AUTO: ABNORMAL K/UL (ref 0–0.5)
EOSINOPHIL NFR BLD: 2 % (ref 0–8)
ERYTHROCYTE [DISTWIDTH] IN BLOOD BY AUTOMATED COUNT: 13.2 % (ref 11.5–14.5)
EST. GFR  (NO RACE VARIABLE): 50 ML/MIN/1.73 M^2
GLUCOSE SERPL-MCNC: 117 MG/DL (ref 70–110)
HCT VFR BLD AUTO: 38.5 % (ref 37–48.5)
HGB BLD-MCNC: 12.6 G/DL (ref 12–16)
IMM GRANULOCYTES # BLD AUTO: ABNORMAL K/UL (ref 0–0.04)
IMM GRANULOCYTES NFR BLD AUTO: ABNORMAL % (ref 0–0.5)
LYMPHOCYTES # BLD AUTO: ABNORMAL K/UL (ref 1–4.8)
LYMPHOCYTES NFR BLD: 68 % (ref 18–48)
MAGNESIUM SERPL-MCNC: 2.1 MG/DL (ref 1.6–2.6)
MCH RBC QN AUTO: 29.2 PG (ref 27–31)
MCHC RBC AUTO-ENTMCNC: 32.7 G/DL (ref 32–36)
MCV RBC AUTO: 89 FL (ref 82–98)
MONOCYTES # BLD AUTO: ABNORMAL K/UL (ref 0.3–1)
MONOCYTES NFR BLD: 5 % (ref 4–15)
NEUTROPHILS NFR BLD: 23 % (ref 38–73)
NRBC BLD-RTO: 0 /100 WBC
PHOSPHATE SERPL-MCNC: 3.6 MG/DL (ref 2.7–4.5)
PLATELET # BLD AUTO: 130 K/UL (ref 150–450)
PLATELET BLD QL SMEAR: ABNORMAL
PMV BLD AUTO: 11 FL (ref 9.2–12.9)
POTASSIUM SERPL-SCNC: 3.8 MMOL/L (ref 3.5–5.1)
PROT SERPL-MCNC: 7.2 G/DL (ref 6–8.4)
RBC # BLD AUTO: 4.32 M/UL (ref 4–5.4)
SODIUM SERPL-SCNC: 139 MMOL/L (ref 136–145)
WBC # BLD AUTO: 10.2 K/UL (ref 3.9–12.7)

## 2024-07-16 PROCEDURE — 36415 COLL VENOUS BLD VENIPUNCTURE: CPT

## 2024-07-16 PROCEDURE — 80053 COMPREHEN METABOLIC PANEL: CPT

## 2024-07-16 PROCEDURE — A9537 TC99M MEBROFENIN: HCPCS | Performed by: INTERNAL MEDICINE

## 2024-07-16 PROCEDURE — 85007 BL SMEAR W/DIFF WBC COUNT: CPT

## 2024-07-16 PROCEDURE — 83735 ASSAY OF MAGNESIUM: CPT

## 2024-07-16 PROCEDURE — 11000001 HC ACUTE MED/SURG PRIVATE ROOM

## 2024-07-16 PROCEDURE — 63600175 PHARM REV CODE 636 W HCPCS

## 2024-07-16 PROCEDURE — 84100 ASSAY OF PHOSPHORUS: CPT

## 2024-07-16 PROCEDURE — 25000003 PHARM REV CODE 250

## 2024-07-16 PROCEDURE — 85027 COMPLETE CBC AUTOMATED: CPT

## 2024-07-16 RX ORDER — KIT FOR THE PREPARATION OF TECHNETIUM TC 99M MEBROFENIN 45 MG/10ML
5.5 INJECTION, POWDER, LYOPHILIZED, FOR SOLUTION INTRAVENOUS
Status: COMPLETED | OUTPATIENT
Start: 2024-07-16 | End: 2024-07-16

## 2024-07-16 RX ORDER — CIPROFLOXACIN 500 MG/1
500 TABLET ORAL EVERY 12 HOURS
Status: DISCONTINUED | OUTPATIENT
Start: 2024-07-16 | End: 2024-07-17 | Stop reason: HOSPADM

## 2024-07-16 RX ADMIN — CIPROFLOXACIN 500 MG: 500 TABLET ORAL at 10:07

## 2024-07-16 RX ADMIN — PIPERACILLIN AND TAZOBACTAM 4.5 G: 4; .5 INJECTION, POWDER, LYOPHILIZED, FOR SOLUTION INTRAVENOUS; PARENTERAL at 09:07

## 2024-07-16 RX ADMIN — PANTOPRAZOLE SODIUM 40 MG: 40 TABLET, DELAYED RELEASE ORAL at 09:07

## 2024-07-16 RX ADMIN — MEBROFENIN 5.5 MILLICURIE: 45 INJECTION, POWDER, LYOPHILIZED, FOR SOLUTION INTRAVENOUS at 12:07

## 2024-07-16 RX ADMIN — PIPERACILLIN AND TAZOBACTAM 4.5 G: 4; .5 INJECTION, POWDER, LYOPHILIZED, FOR SOLUTION INTRAVENOUS; PARENTERAL at 12:07

## 2024-07-16 NOTE — PLAN OF CARE
07/16/24 1157   Post-Acute Status   Post-Acute Authorization Other   Other Status Awaiting f/u Appts   Discharge Delays Orders Needed   Discharge Plan   Discharge Plan A Home with family;Home     Expected Discharge  No discharge order  Medical Readiness for Discharge  Medically ready is not yet set.  Expected Discharge: 7/16/2024Afternoon  Expected Discharge Comment:DC today +/-.Pending medical clearance for DC to home with family.Dispo: pending speciation of urine cx to de-escalate abx.    No future appointments.   Follow-up Information       [facility] - Gastroenterology Follow up.    Specialty: Gastroenterology  Contact information:  [address]  [address]  [facility] [address] [phone]  Additional information:  Please park in Lot C or D and use [address] entrance. Take Medical Office Bldg elevators.    Please check-in for all clinic appointments on the 1st floor, at the desk, in the MOB lobby before coming up to the clinic for your appointment.             [facility] - General Surgery .    Specialty: Surgery  Contact information:  [address]  [address]  [facility] [address] [phone]  Additional information:  Please park in Lot C or D and use [address] entrance. Take Medical Office Bldg elevators.   Please check-in for all clinic appointments on the 1st floor, at the desk, in the MOB lobby before coming up to the clinic for your appointment.             Wanda De La Rosa MD. Go on 7/19/2024.    Specialty: Family Medicine  Why: 8:45am    NOTE: Patient will need to bring ID/Insurance card, Hospital Discharge papers, Medications  PH: [phone]  This is [facility]'s number.  [facility] never answers the phone.  [facility] can schedule /cancel etc.  PH: [phone] Shelli [facility]  Contact information:  [address]  [address]  [place] [address]  [phone]                           No orders of the defined types were placed in this encounter.    Consults (From admission, onward)           Status Ordering Provider     Inpatient consult to Gastroenterology-[facility]  Once        Provider:  Chato Calix MD    Completed [facility]     Inpatient consult to General surgery  Once        Provider:  Aniceto Guzman MD    Completed [facility]

## 2024-07-16 NOTE — PLAN OF CARE
Problem: Adult Inpatient Plan of Care  Goal: Plan of Care Review  Outcome: Progressing  Goal: Patient-Specific Goal (Individualized)  Outcome: Progressing  Goal: Absence of Hospital-Acquired Illness or Injury  Outcome: Progressing  Goal: Optimal Comfort and Wellbeing  Outcome: Progressing  Goal: Readiness for Transition of Care  Outcome: Progressing     Problem: Infection  Goal: Absence of Infection Signs and Symptoms  Outcome: Progressing     Problem: Fall Injury Risk  Goal: Absence of Fall and Fall-Related Injury  Outcome: Progressing     Problem: Pain Acute  Goal: Optimal Pain Control and Function  Outcome: Progressing     Problem: UTI (Urinary Tract Infection)  Goal: Improved Infection Symptoms  Outcome: Progressing

## 2024-07-16 NOTE — PROGRESS NOTES
[facility] Medicine Progress Note    Primary Team: [facility] Hospitalist Team B  Attending Physician: Valdemar Yates MD  Resident: Luis Miguel Molina MD  Intern: Alonso Quezada MD    Subjective:      : #[id number]    Underwent EGD yesterday. Upon return, developed urticaria and swelling in her hands afterwards. Resolved w/benadryl. Refer to plan of care for full details. Today, pt states it did not recur again. Feels well. Denies any pain. Awaiting HIDA scan.     Objective:     Last 24 Hour Vital Signs:  BP  Min: 109/59  Max: 143/88  Temp  Av.3 °F (36.8 °C)  Min: 97.3 °F (36.3 °C)  Max: 99.1 °F (37.3 °C)  Pulse  Av.4  Min: 58  Max: 78  Resp  Av  Min: 14  Max: 18  SpO2  Av.1 %  Min: 97 %  Max: 100 %  I/O last 3 completed shifts:  In: 1659.5 [P.O.:500; I.V.:728; IV Piggyback:431.5]  Out: -     Physical Examination:  Physical Exam  Vitals and nursing note reviewed.   Constitutional:       General: She is not in acute distress.     Appearance: Normal appearance. She is not ill-appearing.   HENT:      Head: Normocephalic and atraumatic.      Right Ear: External ear normal.      Left Ear: External ear normal.   Eyes:      Extraocular Movements: Extraocular movements intact.   Cardiovascular:      Rate and Rhythm: Normal rate and regular rhythm.      Pulses: Normal pulses.      Heart sounds: Normal heart sounds. No murmur heard.  Pulmonary:      Effort: Pulmonary effort is normal. No respiratory distress.      Breath sounds: Normal breath sounds. No wheezing.   Abdominal:      General: Abdomen is flat. Bowel sounds are normal.      Palpations: Abdomen is soft.      Tenderness: There is abdominal tenderness (mild) in the epigastric area.   Musculoskeletal:         General: No swelling or tenderness. Normal range of motion.   Skin:     General: Skin is warm and dry.   Neurological:      General: No focal deficit present.      Mental Status: She is alert and oriented to person, place, and time.    Psychiatric:         Mood and Affect: Mood normal.         Behavior: Behavior normal.         Laboratory:  Laboratory Data Reviewed:  Pertinent Findings:  , ,       Microbiology Data Reviewed:  Pertinent Findings:  BCx x2 NGTD  Ucx: gram neg maty    Other Results:  EKG (my interpretation): None new    Radiology Data Reviewed:   Pertinent Findings:  CT Abd/Pelv 7/13/2024  Impression:  1. Right upper quadrant inflammatory stranding with apparent mild circumferential wall thickening of the distal stomach may reflect sequela of a nonspecific gastritis, including peptic ulcer disease.  Inflammatory stranding and non organized free fluid about the proximal duodenum a significant wall thickening, presumably reactive from suspected gastric inflammatory process.  No evidence of hollow viscus perforation.  2. Gallbladder diffuse wall thickening which suspected mild mucosal hyperenhancement which can be seen with acute or chronic cholecystitis.  Further evaluation with right upper quadrant ultrasound can be obtained as warranted.  3. Left renal 2 subtle ill-defined hypoattenuating parenchymal regions at the upper and lower poles with new minimal left-sided perinephric stranding.  This is overall nonspecific but could reflect sequela of left-sided pyelonephritis.  Clinical correlation and with urinalysis advised.  4. Minimal colonic diverticulosis without diverticulitis.     US abd 7/13/2024  Impression:     Gallbladder wall thickening without evidence of wall hyperemia or pericholecystic fluid, and the sonographic Lerma sign is reportedly negative, favored to be related to chronic cholecystitis.     Current Medications:     Infusions:         Scheduled:   pantoprazole  40 mg Oral Daily    piperacillin-tazobactam (Zosyn) IV (PEDS and ADULTS) (extended infusion is not appropriate)  4.5 g Intravenous Q8H        PRN:    Current Facility-Administered Medications:     diphenhydrAMINE, 25 mg, Oral, Q6H  PRN    ondansetron, 8 mg, Oral, Q6H PRN    sodium chloride 0.9%, 10 mL, Intravenous, PRN    Antibiotics and Day Number of Therapy:  Zosyn 4.5g IV started 7/13/2024    Lines and Day Number of Therapy:  pIV    Assessment:     Brielle Alvarenga is a 48 y.o. Female with no known past medical history who presented to [facility] ED for subjective fevers onset last 4 days with associated abdominal/generalized pain and nausea. Pt febrile to 101.5 while on CTX so broadened to Zosyn. Patient with a fairly unremarkable physical exam besides slight epigastric tenderness. Labs remarkable for elevated LFTs along with an elevated ALP to 618. Ua with positive nitrites and leukocytes. CT and US abdomen imaging findings concerning for acute vs chronic cholecystitis, as well as possible pyelonephritis. Retroperitoneal US neg however. Pt denies dysuria or flank pain. General surgery consulted who then signed off after stating symptoms are related to chronic cholecystitis. GI consulted and pt underwent EGD on 7/15 which showed erosive gastropathy and biopsies taken. Awaiting urine cx speciation. Plan for HIDA scan today to further evaluate gallbladder.     Plan:     #Acute vs Chronic Cholecystitis   - patient with CT and US findings consistent with acute vs chronic cholecystis  - LFTs elevated at , , .   - t bili 0.9  - physical exam with slight epigastric tenderness to palpation  Plan  - general surgery consulted, states gallbladder more calculous like, less likely to be cause of infection currently.   - GI consulted: underwent EGD which revealed erosive gastropathy. F/up biopsies  - Started on protonix daily  - Ordered HIDA scan  - will continue to monitor LFTs, currently downtrending but still highly elevated    #Gram Negative UTI   - UA with 34 RBC, 65 WBC, moderate bacteria  - Blood cx NGTD  - Febrile to 101.5 in the ED, Neg leukocytosis  - CRP 50.8  - CT abd/pelvis with Left renal 2 subtle  ill-defined hypoattenuating parenchymal regions at the upper and lower poles with new minimal left-sided perinephric stranding.  - retroperitoneal US 7/13 with no acute findings.  - Neg for dysuria or CVA tenderness  Plan  - Ucx w/gram neg maty, awaiting speciation  - Blood culture x2 NGTD  - Cont IV zosyn, de-escalate when obtain speciation  - continue to trend and monitor vitals for fevers    Episode of Pruritus and Swelling  - Pt w/pruritus and swelling in fingertips after returning from EGD procedure  - Vitals stable and no swelling noted in oropharynx or other mucosal membranes  - Obtained angioedema labs: [id number] panel pending, C3 and C4 wnl  - CBC did not show change in eosinophils  - Tryptase pending  - Resolved w/benadryl  - Low suspicion for allergic reaction at this time  - CTM       Healthcare Maintenance  - request PCP order        Diet: NPO since midnight for HIDA scan  DVT: SCDs  Code: full     Dispo: pending speciation of urine cx to de-escalate abx    Luis Miguel Molina MD  [facility] Internal Medicine [facility]-III    \[facility]\"" Medicine Hospitalist Pager numbers:   [facility] Hospitalist Medicine Team A (Gabriel/Catherine): [phone]  \[facility]\"" Hospitalist Medicine Team B (Estefany/Trudy):  [phone]

## 2024-07-17 VITALS
SYSTOLIC BLOOD PRESSURE: 122 MMHG | DIASTOLIC BLOOD PRESSURE: 74 MMHG | OXYGEN SATURATION: 96 % | BODY MASS INDEX: 30.16 KG/M2 | RESPIRATION RATE: 18 BRPM | HEIGHT: 66 IN | TEMPERATURE: 98 F | HEART RATE: 71 BPM | WEIGHT: 187.63 LBS

## 2024-07-17 PROBLEM — N12 PYELONEPHRITIS: Status: RESOLVED | Noted: 2024-07-13 | Resolved: 2024-07-17

## 2024-07-17 PROBLEM — D69.6 THROMBOCYTOPENIA: Status: RESOLVED | Noted: 2024-07-13 | Resolved: 2024-07-17

## 2024-07-17 PROBLEM — N39.0 URINARY TRACT INFECTION WITHOUT HEMATURIA: Status: ACTIVE | Noted: 2024-07-17

## 2024-07-17 LAB
ALBUMIN SERPL BCP-MCNC: 3.3 G/DL (ref 3.5–5.2)
ALP SERPL-CCNC: 383 U/L (ref 55–135)
ALT SERPL W/O P-5'-P-CCNC: 130 U/L (ref 10–44)
ANION GAP SERPL CALC-SCNC: 10 MMOL/L (ref 8–16)
AST SERPL-CCNC: 94 U/L (ref 10–40)
BASOPHILS # BLD AUTO: ABNORMAL K/UL (ref 0–0.2)
BASOPHILS NFR BLD: 1 % (ref 0–1.9)
BILIRUB SERPL-MCNC: 0.6 MG/DL (ref 0.1–1)
BUN SERPL-MCNC: 10 MG/DL (ref 6–20)
CALCIUM SERPL-MCNC: 8.7 MG/DL (ref 8.7–10.5)
CHLORIDE SERPL-SCNC: 106 MMOL/L (ref 95–110)
CO2 SERPL-SCNC: 22 MMOL/L (ref 23–29)
CREAT SERPL-MCNC: 1 MG/DL (ref 0.5–1.4)
DIFFERENTIAL METHOD BLD: ABNORMAL
EOSINOPHIL # BLD AUTO: ABNORMAL K/UL (ref 0–0.5)
EOSINOPHIL NFR BLD: 1 % (ref 0–8)
ERYTHROCYTE [DISTWIDTH] IN BLOOD BY AUTOMATED COUNT: 13.2 % (ref 11.5–14.5)
EST. GFR  (NO RACE VARIABLE): >60 ML/MIN/1.73 M^2
GLUCOSE SERPL-MCNC: 101 MG/DL (ref 70–110)
HCT VFR BLD AUTO: 37.9 % (ref 37–48.5)
HGB BLD-MCNC: 12.6 G/DL (ref 12–16)
IMM GRANULOCYTES # BLD AUTO: ABNORMAL K/UL (ref 0–0.04)
IMM GRANULOCYTES NFR BLD AUTO: ABNORMAL % (ref 0–0.5)
LYMPHOCYTES # BLD AUTO: ABNORMAL K/UL (ref 1–4.8)
LYMPHOCYTES NFR BLD: 55 % (ref 18–48)
MAGNESIUM SERPL-MCNC: 1.9 MG/DL (ref 1.6–2.6)
MCH RBC QN AUTO: 29.4 PG (ref 27–31)
MCHC RBC AUTO-ENTMCNC: 33.2 G/DL (ref 32–36)
MCV RBC AUTO: 88 FL (ref 82–98)
MONOCYTES # BLD AUTO: ABNORMAL K/UL (ref 0.3–1)
MONOCYTES NFR BLD: 4 % (ref 4–15)
NEUTROPHILS NFR BLD: 39 % (ref 38–73)
NRBC BLD-RTO: 0 /100 WBC
PHOSPHATE SERPL-MCNC: 3.5 MG/DL (ref 2.7–4.5)
PLATELET # BLD AUTO: 167 K/UL (ref 150–450)
PMV BLD AUTO: 10.3 FL (ref 9.2–12.9)
POTASSIUM SERPL-SCNC: 3.9 MMOL/L (ref 3.5–5.1)
PROT SERPL-MCNC: 7.6 G/DL (ref 6–8.4)
RBC # BLD AUTO: 4.29 M/UL (ref 4–5.4)
SODIUM SERPL-SCNC: 138 MMOL/L (ref 136–145)
TRYPTASE LEVEL: 10.5 NG/ML
WBC # BLD AUTO: 8.33 K/UL (ref 3.9–12.7)

## 2024-07-17 PROCEDURE — 85007 BL SMEAR W/DIFF WBC COUNT: CPT

## 2024-07-17 PROCEDURE — 85027 COMPLETE CBC AUTOMATED: CPT

## 2024-07-17 PROCEDURE — 25000003 PHARM REV CODE 250

## 2024-07-17 PROCEDURE — 83735 ASSAY OF MAGNESIUM: CPT

## 2024-07-17 PROCEDURE — 36415 COLL VENOUS BLD VENIPUNCTURE: CPT

## 2024-07-17 PROCEDURE — 84100 ASSAY OF PHOSPHORUS: CPT

## 2024-07-17 PROCEDURE — 80053 COMPREHEN METABOLIC PANEL: CPT

## 2024-07-17 RX ORDER — PANTOPRAZOLE SODIUM 40 MG/1
40 TABLET, DELAYED RELEASE ORAL DAILY
Qty: 30 TABLET | Refills: 3 | Status: SHIPPED | OUTPATIENT
Start: 2024-07-18 | End: 2024-07-17

## 2024-07-17 RX ORDER — CIPROFLOXACIN 500 MG/1
500 TABLET ORAL EVERY 12 HOURS
Qty: 12 TABLET | Refills: 0 | Status: SHIPPED | OUTPATIENT
Start: 2024-07-17 | End: 2024-07-17

## 2024-07-17 RX ORDER — PANTOPRAZOLE SODIUM 40 MG/1
40 TABLET, DELAYED RELEASE ORAL DAILY
Qty: 30 TABLET | Refills: 3 | Status: SHIPPED | OUTPATIENT
Start: 2024-07-18 | End: 2025-07-18

## 2024-07-17 RX ORDER — ACETAMINOPHEN 325 MG/1
650 TABLET ORAL EVERY 6 HOURS PRN
Status: DISCONTINUED | OUTPATIENT
Start: 2024-07-17 | End: 2024-07-17 | Stop reason: HOSPADM

## 2024-07-17 RX ORDER — CIPROFLOXACIN 500 MG/1
500 TABLET ORAL EVERY 12 HOURS
Qty: 12 TABLET | Refills: 0 | Status: SHIPPED | OUTPATIENT
Start: 2024-07-17 | End: 2024-07-23

## 2024-07-17 RX ADMIN — PANTOPRAZOLE SODIUM 40 MG: 40 TABLET, DELAYED RELEASE ORAL at 08:07

## 2024-07-17 RX ADMIN — ACETAMINOPHEN 650 MG: 325 TABLET ORAL at 01:07

## 2024-07-17 RX ADMIN — CIPROFLOXACIN 500 MG: 500 TABLET ORAL at 08:07

## 2024-07-17 NOTE — PLAN OF CARE
Problem: Adult Inpatient Plan of Care  Goal: Plan of Care Review  Outcome: Progressing  Goal: Optimal Comfort and Wellbeing  Outcome: Progressing  Goal: Readiness for Transition of Care  Outcome: Progressing     Problem: Infection  Goal: Absence of Infection Signs and Symptoms  Outcome: Progressing     Problem: Fall Injury Risk  Goal: Absence of Fall and Fall-Related Injury  Outcome: Progressing     Problem: Pain Acute  Goal: Optimal Pain Control and Function  Outcome: Progressing     Problem: UTI (Urinary Tract Infection)  Goal: Improved Infection Symptoms  Outcome: Progressing

## 2024-07-17 NOTE — PLAN OF CARE
Problem: Adult Inpatient Plan of Care  Goal: Plan of Care Review  Outcome: Met  Goal: Patient-Specific Goal (Individualized)  Outcome: Met  Goal: Absence of Hospital-Acquired Illness or Injury  Outcome: Met  Goal: Optimal Comfort and Wellbeing  Outcome: Met  Goal: Readiness for Transition of Care  Outcome: Met     Problem: Infection  Goal: Absence of Infection Signs and Symptoms  Outcome: Progressing     Problem: Fall Injury Risk  Goal: Absence of Fall and Fall-Related Injury  Outcome: Met     Problem: Pain Acute  Goal: Optimal Pain Control and Function  Outcome: Met     Problem: UTI (Urinary Tract Infection)  Goal: Improved Infection Symptoms  Outcome: Met

## 2024-07-17 NOTE — PLAN OF CARE
07/17/24 0936   Post-Acute Status   Post-Acute Authorization Other   Other Status No Post-Acute Service Needs   Discharge Delays Orders Needed   Discharge Plan   Discharge Plan A Home with family;Home     Expected Discharge  No discharge order  Medical Readiness for Discharge  Medically ready anticipated for 7/17/2024.  Updated by Jacque Rubio RN on 7/16/2024 at 1608.  Expected Discharge: 7/17/2024Afternoon  Expected Discharge Comment:DC today +/-.Pending medical clearance for DC to home with family.Dispo: UCx with E. Coli sensitive to ciprofloxacin. Pending Discharge on PO abx.    Future Appointments   Date Time Provider Department Center   8/5/2024  2:45 PM [facility]      Follow-up Information       [facility] - Gastroenterology Follow up.    Specialty: Gastroenterology  Contact information:  [address]  [facility] [address]  [facility] [address] [phone]  Additional information:  Please park in Lot C or D and use [address] entrance. Take Medical Office Bldg elevators.    Please check-in for all clinic appointments on the 1st floor, at the desk, in the [employer] lobby before coming up to the clinic for your appointment.             [facility] - General Surgery .    Specialty: Surgery  Contact information:  [address]  [facility] [address]  [facility] [address] [phone]  Additional information:  Please park in Lot C or D and use [address] entrance. Take Medical Office Bldg elevators.   Please check-in for all clinic appointments on the 1st floor, at the desk, in the MOB lobby before coming up to the clinic for your appointment.             Wanda De La Rosa MD. Go on 7/19/2024.    Specialty: Family Medicine  Why: 8:45am    NOTE: Patient will need to bring ID/Insurance card, Hospital Discharge papers, Medications  PH: [phone]  This is [facility]'s number.  [facility] never answers the phone.  [facility] can schedule /cancel etc.  PH: [phone] [facility]  Contact  information:  [address]  [place] [address]  [phone]                           Consults (From admission, onward)          Status Ordering Provider     Inpatient consult to Gastroenterology-[facility]  Once        Provider:  Chato Calix MD    Completed [facility]     Inpatient consult to General surgery  Once        Provider:  Aniceto Guzman MD    Completed [facility]          No orders of the defined types were placed in this encounter.

## 2024-07-17 NOTE — PROGRESS NOTES
Discharge education provided by this nurse with assistance from  Delroy #[id number] and Imelda #[id number]; pt acknowledges understanding of all appointments and medications. Pt will call family to pick her up states they will arrive in about an hour. IV removed. Pt resting in bed with all belongings on the table. No signs of distress at this time.

## 2024-07-17 NOTE — PROGRESS NOTES
[facility] Medicine Progress Note    Primary Team: [facility] [place] Hospitalist Team B  Attending Physician: Valdemar Yates MD  Resident: Luis Miguel Molina MD  Intern: Alonso Quezada MD    Subjective:      : #[id number]    Doing well this morning. Vitals stable, afebrile.   HIDA scan performed yesterday with patent cystic/common bile ducts. However, hepatic uptake and clearance not well assessed 2/2 to power outage. UCx with Ecoli sensitive to ciprofloxacin, was transition to PO ciprofloxacin from zosyn IV.     Objective:     Last 24 Hour Vital Signs:  BP  Min: 118/67  Max: 129/78  Temp  Av.5 °F (36.9 °C)  Min: 98.1 °F (36.7 °C)  Max: 98.9 °F (37.2 °C)  Pulse  Av  Min: 53  Max: 63  Resp  Av  Min: 18  Max: 18  SpO2  Av.2 %  Min: 97 %  Max: 100 %  I/O last 3 completed shifts:  In: 120 [P.O.:120]  Out: -     Physical Examination:  Physical Exam  Vitals and nursing note reviewed.   Constitutional:       General: She is not in acute distress.     Appearance: Normal appearance. She is not ill-appearing.   HENT:      Head: Normocephalic and atraumatic.      Right Ear: External ear normal.      Left Ear: External ear normal.   Eyes:      Extraocular Movements: Extraocular movements intact.   Cardiovascular:      Rate and Rhythm: Normal rate and regular rhythm.      Pulses: Normal pulses.      Heart sounds: Normal heart sounds. No murmur heard.  Pulmonary:      Effort: Pulmonary effort is normal. No respiratory distress.      Breath sounds: Normal breath sounds. No wheezing.   Abdominal:      General: Abdomen is flat. Bowel sounds are normal.      Palpations: Abdomen is soft.      Tenderness: There is no abdominal tenderness.   Musculoskeletal:         General: No swelling or tenderness. Normal range of motion.   Skin:     General: Skin is warm and dry.   Neurological:      General: No focal deficit present.      Mental Status: She is alert and oriented to person, place, and time.   Psychiatric:         Mood and  Affect: Mood normal.         Behavior: Behavior normal.         Laboratory:  Laboratory Data Reviewed:  Pertinent Findings:  , ,  now down trending.      Microbiology Data Reviewed:  Pertinent Findings:  BCx x2 NGTD  Ucx: gram neg maty    Other Results:  EKG (my interpretation): None new    Radiology Data Reviewed:   Pertinent Findings:  CT Abd/Pelv 7/13/2024  Impression:  1. Right upper quadrant inflammatory stranding with apparent mild circumferential wall thickening of the distal stomach may reflect sequela of a nonspecific gastritis, including peptic ulcer disease.  Inflammatory stranding and non organized free fluid about the proximal duodenum a significant wall thickening, presumably reactive from suspected gastric inflammatory process.  No evidence of hollow viscus perforation.  2. Gallbladder diffuse wall thickening which suspected mild mucosal hyperenhancement which can be seen with acute or chronic cholecystitis.  Further evaluation with right upper quadrant ultrasound can be obtained as warranted.  3. Left renal 2 subtle ill-defined hypoattenuating parenchymal regions at the upper and lower poles with new minimal left-sided perinephric stranding.  This is overall nonspecific but could reflect sequela of left-sided pyelonephritis.  Clinical correlation and with urinalysis advised.  4. Minimal colonic diverticulosis without diverticulitis.     US abd 7/13/2024  Impression:     Gallbladder wall thickening without evidence of wall hyperemia or pericholecystic fluid, and the sonographic Lerma sign is reportedly negative, favored to be related to chronic cholecystitis.     Current Medications:     Infusions:         Scheduled:   ciprofloxacin HCl  500 mg Oral Q12H    pantoprazole  40 mg Oral Daily        PRN:    Current Facility-Administered Medications:     diphenhydrAMINE, 25 mg, Oral, Q6H PRN    ondansetron, 8 mg, Oral, Q6H PRN    sodium chloride 0.9%, 10 mL, Intravenous,  PRN    Antibiotics and Day Number of Therapy:  Zosyn 4.5g IV 7/13/2024 - 7/16/2024  Ciprofloxacin 500 mg BID 7/16/2024    Lines and Day Number of Therapy:  pIV    Assessment:     Brielle Alvarenga is a 48 y.o. Female with no known past medical history who presented to [facility] ED for subjective fevers onset last 4 days with associated abdominal/generalized pain and nausea. Pt febrile to 101.5 while on CTX so broadened to Zosyn. Patient with a fairly unremarkable physical exam besides slight epigastric tenderness. Labs remarkable for elevated LFTs along with an elevated ALP to 618. Ua with positive nitrites and leukocytes. CT and US abdomen imaging findings concerning for acute vs chronic cholecystitis, as well as possible pyelonephritis. Retroperitoneal US neg however. Pt denies dysuria or flank pain. General surgery consulted who then signed off after stating symptoms are related to chronic cholecystitis. GI consulted and pt underwent EGD on 7/15 which showed erosive gastropathy and biopsies taken. UCx with E. Coli, will DC on ciprofloxacin PO. HIDA scan with patent cystic and common bile ducts, but hepatic uptake and clearance not well assess due to power outage yesterday.      Plan:     #Acute vs Chronic Cholecystitis   - patient with CT and US findings consistent with acute vs chronic cholecystis  - LFTs elevated at , , .   - t bili 0.9  - physical exam with slight epigastric tenderness to palpation  Plan  - general surgery consulted, states gallbladder more calculous like, less likely to be cause of infection currently.   - GI consulted: underwent EGD which revealed erosive gastropathy. F/up biopsies  - Started on protonix daily  - HIDA scan with patent cystic and common bile ducts, but hepatic uptake and clearance not well assess due to power outage yesterday.   - will continue to monitor LFTs, currently downtrending but still highly elevated    #Gram Negative UTI   - UA with  34 RBC, 65 WBC, moderate bacteria  - Blood cx NGTD  - Febrile to 101.5 in the ED, Neg leukocytosis  - CRP 50.8  - CT abd/pelvis with Left renal 2 subtle ill-defined hypoattenuating parenchymal regions at the upper and lower poles with new minimal left-sided perinephric stranding.  - retroperitoneal US 7/13 with no acute findings.  - Neg for dysuria or CVA tenderness  Plan  - Blood culture x2 NGTD day 4  - Ucx with E coli sensitive to ciprofloxacin.   - deescalate abx from zosyn to ciprofloxacin PO  - continue to trend and monitor vitals for fevers    Episode of Pruritus and Swelling  - Pt w/pruritus and swelling in fingertips after returning from EGD procedure  - Vitals stable and no swelling noted in oropharynx or other mucosal membranes  - Obtained angioedema labs: [id number] panel pending, C3 and C4 wnl  - CBC did not show change in eosinophils  - Tryptase pending  - Resolved w/benadryl  - Low suspicion for allergic reaction at this time  - CTM       Healthcare Maintenance  - request PCP order        Diet: NPO since midnight for HIDA scan  DVT: SCDs  Code: full     Dispo: UCx with E. Coli sensitive to ciprofloxacin. Pending Discharge on PO abx.     Alonso Quezada MD  [facility]      [facility] Hospitalist Pager numbers:   [facility] Hospitalist Medicine Team A (Gabriel/Catherine): [phone]  [facility] Hospitalist Medicine Team B (Estefany/Trudy):  [phone]

## 2024-07-17 NOTE — DISCHARGE SUMMARY
[facility] Hospital Medicine Discharge Summary    Primary Team: [facility] Hospitalist Team B  Attending Physician: Valdemar Yates MD  Resident: Luis Miguel Molina MD  Intern: Alonso Quezada MD    Date of Admit: 7/13/2024  Date of Discharge: 7/17/2024    Discharge to: Home  Condition: Stable    Discharge Diagnoses     Patient Active Problem List   Diagnosis    Cholecystitis    Transaminitis    Elevated alkaline phosphatase level    Urinary tract infection without hematuria       Consultants and Procedures     Consultants:  Gastroenterology, General Surgery    Procedures:   EGD    Imaging:  CT Abd Pelvis, US abdomen, Xray Chest, US retroperitoneal, HIDA scan    Brief History of Present Illness      Brielle Alvarenga is a 48 y.o. Female with no known past medical history who presented to [facility] ED for subjective fevers onset last 4 days with associated abdominal/generalized pain and nausea. Patient hemodynamically stable and afebrile in ED, although patient reports taking diclofenac over the last few days. Patient with a fairly unremarkable physical exam besides slight epigastric tenderness. Patient's labs are remarkable for elevated LFTs with an elevated ALP to 618. Ua with positive nitrites and leukocytes. CT and US abdomen imaging findings concerning for acute vs chronic cholecystitis, as well as possible pyelonephritis; also noted for concerns of gastric and duodenal thickening. CBC with no leukocytosis and patient currently denies any urinary symptoms. Patient admitted to [facility] IM for treatment of UTI as well as management of cholecystitis.     Patient was initially placed on rocephin in ED, but patient still become febrile to 101.5 F. Was escalated to Zosyn IV. General surgery consulted for surgical evaluation for cholecystectomy, however, no acute surgical intervention was necessary per surgery due to findings not consistent with acute cholecystitis and recommended HIDA scan. GI was then consulted, had EGD which showed  erosive gastropathy with no stigmata of recent bleeding. Was biopsied and recommended to place on daily PPI therapy. Results of biopsy still pending for H pylori. Of note, patient reported urticaria and swelling to hands right after EGD, resolved with benadryl. C3/C4 compliment normal, no eosinophilia on CBC. C1 esterase inhibitor and tryptase still pending. Otherwise, swelling and urticaria resolved by next day and has not returned.     HIDA scan was also performed showing no acute dilation of cystic and common bile ducts. However, hepatic uptake and clearance was not well assessed due to a power outage and 1 hour of dynamic imaging was lost. GI noted that this was still fine to rule out any acute processes, but may still need follow up.     By day 4 of stay, urine cultures resulted positive for E coli sensitive to ciprofloxacin. She was transitioned to PO ciprofloxacin 500 mg BID and will continue for total abx treatment of 10 days ending on 7/23. BCx drawn on 7/13 and had no growth to date. By end of stay, LFTs down trending but still remarkably elevated. Will follow up with GI outpatient.      For the full HPI please refer to the History & Physical from this admission.    Hospital Course By Problem with Pertinent Findings     #Acute vs Chronic Cholecystitis   - patient with CT and US findings consistent with acute vs chronic cholecystis  - LFTs elevated at , , .   - t bili 0.9  - physical exam with slight epigastric tenderness to palpation  - general surgery consulted, states gallbladder more calculous like, less likely to be cause of infection currently.   - HIDA scan with patent cystic and common bile ducts, but hepatic uptake and clearance not well assess due to power outage yesterday.  - GI consulted: underwent EGD which revealed erosive gastropathy.   - will continue to monitor LFTs, currently downtrending but still highly elevated  Plan  - F/up biopsies from EGD.  - Started on  protonix daily  - follow up with GI outpatient.      #Gram Negative UTI   - UA with 34 RBC, 65 WBC, moderate bacteria  - Blood cx NGTD  - Febrile to 101.5 in the ED, Neg leukocytosis  - CRP 50.8  - CT abd/pelvis with Left renal 2 subtle ill-defined hypoattenuating parenchymal regions at the upper and lower poles with new minimal left-sided perinephric stranding.  - retroperitoneal US 7/13 with no acute findings.  - Neg for dysuria or CVA tenderness  Plan  - Blood culture x2 NGTD day 4  - Ucx with E coli sensitive to ciprofloxacin.   - deescalate abx from zosyn to ciprofloxacin PO, will finish on 7/23       Episode of Pruritus and Swelling  - Pt w/pruritus and swelling in fingertips after returning from EGD procedure  - Vitals stable and no swelling noted in oropharynx or other mucosal membranes  - Obtained angioedema labs: [id number] panel pending, C3 and C4 wnl  - CBC did not show change in eosinophils  - Tryptase pending  - Resolved w/benadryl  - Low suspicion for allergic reaction at this time  - CTM    Discharge Medications        Medication List        START taking these medications      ciprofloxacin HCl 500 MG tablet  Commonly known as: CIPRO  Cricket 1 tableta (500 mg en total) por vía oral cada 12 (doce) horas por 6 días  (Take 1 tablet (500 mg total) by mouth every 12 (twelve) hours. for 6 days)     pantoprazole 40 MG tablet  Commonly known as: PROTONIX  Cricket estevan tableta (40 mg en total) por vía oral diariamente.  (Take 1 tablet (40 mg total) by mouth once daily.)  Start taking on: July 18, 2024               Where to Get Your Medications        These medications were sent to [facility] [place] [facility] [address] [place] [address]      Phone: [phone]   ciprofloxacin HCl 500 MG tablet  pantoprazole 40 MG tablet         Discharge Information:     Diet:  Regular    Physical Activity:  As tolerated             Instructions:  1. Take all medications as prescribed  2.  Keep all follow-up appointments  3. Return to the hospital or call your primary care physicians if any worsening symptoms such as fever, chest pain, shortness of breath, return of symptoms, or any other concerns.    Follow-Up Appointments:  Gastroenterology 08/05/2024    Alonso Quezada MD  [facility]  [facility] Medicine Team B

## 2024-07-19 LAB
BACTERIA BLD CULT: NORMAL
BACTERIA BLD CULT: NORMAL

## 2024-07-22 LAB
C1INH SERPL-MCNC: 52 MG/DL (ref 21–39)
FINAL PATHOLOGIC DIAGNOSIS: NORMAL
GROSS: NORMAL
Lab: NORMAL
MICROSCOPIC EXAM: NORMAL

## 2024-07-29 PROBLEM — R10.10 UPPER ABDOMINAL PAIN: Status: ACTIVE | Noted: 2024-07-29

## 2024-07-29 PROBLEM — K29.70 GASTRITIS: Status: ACTIVE | Noted: 2024-07-29

## 2024-08-05 ENCOUNTER — OFFICE VISIT (OUTPATIENT)
Dept: GASTROENTEROLOGY | Facility: CLINIC | Age: 49
End: 2024-08-05
Payer: COMMERCIAL

## 2024-08-05 VITALS
HEART RATE: 87 BPM | HEIGHT: 66 IN | SYSTOLIC BLOOD PRESSURE: 114 MMHG | BODY MASS INDEX: 28.38 KG/M2 | DIASTOLIC BLOOD PRESSURE: 68 MMHG | WEIGHT: 176.56 LBS

## 2024-08-05 DIAGNOSIS — R10.9 ABDOMINAL PAIN, UNSPECIFIED ABDOMINAL LOCATION: Primary | ICD-10-CM

## 2024-08-05 DIAGNOSIS — K81.9 CHOLECYSTITIS: ICD-10-CM

## 2024-08-05 DIAGNOSIS — R74.8 ELEVATED ALKALINE PHOSPHATASE LEVEL: ICD-10-CM

## 2024-08-05 DIAGNOSIS — R74.01 TRANSAMINITIS: ICD-10-CM

## 2024-08-05 PROCEDURE — 99999 PR PBB SHADOW E&M-EST. PATIENT-LVL III: CPT | Mod: PBBFAC,,, | Performed by: INTERNAL MEDICINE

## 2024-08-05 PROCEDURE — 99024 POSTOP FOLLOW-UP VISIT: CPT | Mod: 95,,, | Performed by: INTERNAL MEDICINE
